# Patient Record
Sex: MALE | Race: WHITE | NOT HISPANIC OR LATINO | Employment: OTHER | URBAN - NONMETROPOLITAN AREA
[De-identification: names, ages, dates, MRNs, and addresses within clinical notes are randomized per-mention and may not be internally consistent; named-entity substitution may affect disease eponyms.]

---

## 2017-04-21 ENCOUNTER — HOSPITAL ENCOUNTER (EMERGENCY)
Facility: HOSPITAL | Age: 62
Discharge: HOME | End: 2017-04-21
Attending: STUDENT IN AN ORGANIZED HEALTH CARE EDUCATION/TRAINING PROGRAM | Admitting: STUDENT IN AN ORGANIZED HEALTH CARE EDUCATION/TRAINING PROGRAM

## 2017-04-21 VITALS
SYSTOLIC BLOOD PRESSURE: 138 MMHG | OXYGEN SATURATION: 93 % | TEMPERATURE: 98.6 F | HEIGHT: 69 IN | HEART RATE: 81 BPM | RESPIRATION RATE: 19 BRPM | DIASTOLIC BLOOD PRESSURE: 71 MMHG | WEIGHT: 280 LBS | BODY MASS INDEX: 41.47 KG/M2

## 2017-04-21 DIAGNOSIS — K62.5 BRBPR (BRIGHT RED BLOOD PER RECTUM): Primary | ICD-10-CM

## 2017-04-21 LAB
ALBUMIN SERPL-MCNC: 4.4 G/DL (ref 3.5–5)
ALBUMIN/GLOB SERPL: 1.3 G/DL (ref 1–2)
ALP SERPL-CCNC: 99 U/L (ref 38–126)
ALT SERPL W P-5'-P-CCNC: 110 U/L (ref 13–69)
ANION GAP SERPL CALCULATED.3IONS-SCNC: 19.1 MMOL/L
APTT PPP: 24.1 SECONDS (ref 25–36)
AST SERPL-CCNC: 96 U/L (ref 15–46)
BASOPHILS # BLD AUTO: 0.06 10*3/MM3 (ref 0–0.2)
BASOPHILS NFR BLD AUTO: 0.9 % (ref 0–2.5)
BILIRUB SERPL-MCNC: 1.1 MG/DL (ref 0.2–1.3)
BUN BLD-MCNC: 18 MG/DL (ref 7–20)
BUN/CREAT SERPL: 22.5 (ref 6.3–21.9)
CALCIUM SPEC-SCNC: 9 MG/DL (ref 8.4–10.2)
CHLORIDE SERPL-SCNC: 102 MMOL/L (ref 98–107)
CO2 SERPL-SCNC: 20 MMOL/L (ref 26–30)
CREAT BLD-MCNC: 0.8 MG/DL (ref 0.6–1.3)
DEPRECATED RDW RBC AUTO: 43.8 FL (ref 37–54)
EOSINOPHIL # BLD AUTO: 0.07 10*3/MM3 (ref 0–0.7)
EOSINOPHIL NFR BLD AUTO: 1.1 % (ref 0–7)
ERYTHROCYTE [DISTWIDTH] IN BLOOD BY AUTOMATED COUNT: 13.5 % (ref 11.5–14.5)
GFR SERPL CREATININE-BSD FRML MDRD: 99 ML/MIN/1.73
GLOBULIN UR ELPH-MCNC: 3.4 GM/DL
GLUCOSE BLD-MCNC: 103 MG/DL (ref 74–98)
HCT VFR BLD AUTO: 44.9 % (ref 42–52)
HGB BLD-MCNC: 15.6 G/DL (ref 14–18)
IMM GRANULOCYTES # BLD: 0.02 10*3/MM3 (ref 0–0.06)
IMM GRANULOCYTES NFR BLD: 0.3 % (ref 0–0.6)
INR PPP: 1.1 (ref 0.9–1.1)
LYMPHOCYTES # BLD AUTO: 2.26 10*3/MM3 (ref 0.6–3.4)
LYMPHOCYTES NFR BLD AUTO: 34.8 % (ref 10–50)
MCH RBC QN AUTO: 30.9 PG (ref 27–31)
MCHC RBC AUTO-ENTMCNC: 34.7 G/DL (ref 30–37)
MCV RBC AUTO: 88.9 FL (ref 80–94)
MONOCYTES # BLD AUTO: 0.61 10*3/MM3 (ref 0–0.9)
MONOCYTES NFR BLD AUTO: 9.4 % (ref 0–12)
NEUTROPHILS # BLD AUTO: 3.48 10*3/MM3 (ref 2–6.9)
NEUTROPHILS NFR BLD AUTO: 53.5 % (ref 37–80)
NRBC BLD MANUAL-RTO: 0 /100 WBC (ref 0–0)
PLATELET # BLD AUTO: 232 10*3/MM3 (ref 130–400)
PMV BLD AUTO: 9.7 FL (ref 6–12)
POTASSIUM BLD-SCNC: 4.1 MMOL/L (ref 3.5–5.1)
PROT SERPL-MCNC: 7.8 G/DL (ref 6.3–8.2)
PROTHROMBIN TIME: 12 SECONDS (ref 9.3–12.1)
RBC # BLD AUTO: 5.05 10*6/MM3 (ref 4.7–6.1)
SODIUM BLD-SCNC: 137 MMOL/L (ref 137–145)
WBC NRBC COR # BLD: 6.5 10*3/MM3 (ref 4.8–10.8)

## 2017-04-21 PROCEDURE — 80053 COMPREHEN METABOLIC PANEL: CPT | Performed by: STUDENT IN AN ORGANIZED HEALTH CARE EDUCATION/TRAINING PROGRAM

## 2017-04-21 PROCEDURE — 85730 THROMBOPLASTIN TIME PARTIAL: CPT | Performed by: STUDENT IN AN ORGANIZED HEALTH CARE EDUCATION/TRAINING PROGRAM

## 2017-04-21 PROCEDURE — 85025 COMPLETE CBC W/AUTO DIFF WBC: CPT | Performed by: STUDENT IN AN ORGANIZED HEALTH CARE EDUCATION/TRAINING PROGRAM

## 2017-04-21 PROCEDURE — 85610 PROTHROMBIN TIME: CPT | Performed by: STUDENT IN AN ORGANIZED HEALTH CARE EDUCATION/TRAINING PROGRAM

## 2017-04-21 PROCEDURE — 99283 EMERGENCY DEPT VISIT LOW MDM: CPT

## 2017-04-21 RX ORDER — ASPIRIN 81 MG/1
81 TABLET, CHEWABLE ORAL DAILY
COMMUNITY
End: 2017-06-15

## 2017-04-21 RX ORDER — ALLOPURINOL 300 MG/1
300 TABLET ORAL DAILY
COMMUNITY

## 2017-04-21 RX ORDER — ATENOLOL 25 MG/1
25 TABLET ORAL DAILY
COMMUNITY

## 2017-04-21 RX ORDER — ATORVASTATIN CALCIUM 40 MG/1
40 TABLET, FILM COATED ORAL DAILY
COMMUNITY

## 2017-04-21 RX ORDER — LISINOPRIL 20 MG/1
20 TABLET ORAL DAILY
COMMUNITY

## 2017-04-21 NOTE — ED NOTES
Called Dr. Underwood's office for Dr. Shields to schedule appt for patient. First available appt is May 25, 2017 at 10:00 am.  Dr. Underwood's office will mail patient a packet prior to appt.     Aryan Craven  04/21/17 0934

## 2017-04-21 NOTE — ED PROVIDER NOTES
Subjective   HPI Comments: 61-year-old male that presents with concerns for bloody stools.  He states over the past 2 days he has had bloody stool in the morning when he gets up.  States he feels a little nauseous today but has not vomited.  States he has had a little bit of blood on the toilet paper when he wiped for the past few months.  He has no true abdominal pain.  He is concerned about colon cancer as he has had 2 uncles that have had colon cancer in their 70s.  His last colonoscopy was 5 years ago and he has an appointment next week with his primary care provider to set up another colonoscopy.      Review of Systems   All other systems reviewed and are negative.      Past Medical History:   Diagnosis Date   • Gout    • Hypertension        No Known Allergies    History reviewed. No pertinent surgical history.    History reviewed. No pertinent family history.    Social History     Social History   • Marital status:      Spouse name: N/A   • Number of children: N/A   • Years of education: N/A     Social History Main Topics   • Smoking status: Former Smoker   • Smokeless tobacco: None   • Alcohol use Yes      Comment: 8-10  beers daily,  and  shots whiskey, -daily   • Drug use: No   • Sexual activity: Not Asked     Other Topics Concern   • None     Social History Narrative   • None           Objective   Physical Exam   Nursing note and vitals reviewed.  GEN: No acute distress  Head: Normocephalic, atraumatic  Eyes: Pupils equal round reactive to light  ENT: Posterior pharynx normal in appearance, oral mucosa is moist  Chest: Nontender to palpation  Cardiovascular: Regular rate  Lungs: Clear to auscultation bilaterally  Abdomen: Soft, nontender, nondistended, no peritoneal signs  Extremities: No edema, normal appearance  Neuro: GCS 15  Psych: Mood and affect are appropriate  Rectal exam: No gross blood on rectal exam.  No hemorrhoid internal or external found.  No palpable mass or  fissure.    Procedures         ED Course  ED Course                  MDM  Number of Diagnoses or Management Options  BRBPR (bright red blood per rectum):   Diagnosis management comments: Patient is a chronic drinker.  He stated to me during our second conversation that he normally drinks 45 beers in the morning a couple shots of whiskey and then after his afternoon nap he will drink for 5 more beers a couple shots of whiskey.  Unsure what is causing his bleeding at this time but he is hemodynamically stable with an H&H that is normal.  We did call 's office in the first available appointment for him was established.  The give the patient signs or symptoms to look for to prompt follow-up       Amount and/or Complexity of Data Reviewed  Clinical lab tests: ordered and reviewed        Final diagnoses:   BRBPR (bright red blood per rectum)            Terrence Shields MD  04/21/17 0961

## 2017-05-25 ENCOUNTER — PREP FOR SURGERY (OUTPATIENT)
Dept: OTHER | Facility: HOSPITAL | Age: 62
End: 2017-05-25

## 2017-05-25 ENCOUNTER — OFFICE VISIT (OUTPATIENT)
Dept: GASTROENTEROLOGY | Facility: CLINIC | Age: 62
End: 2017-05-25

## 2017-05-25 VITALS
WEIGHT: 276 LBS | HEIGHT: 69 IN | BODY MASS INDEX: 40.88 KG/M2 | DIASTOLIC BLOOD PRESSURE: 77 MMHG | HEART RATE: 88 BPM | SYSTOLIC BLOOD PRESSURE: 145 MMHG | RESPIRATION RATE: 18 BRPM | TEMPERATURE: 99.5 F

## 2017-05-25 DIAGNOSIS — Z12.11 COLON CANCER SCREENING: Primary | ICD-10-CM

## 2017-05-25 DIAGNOSIS — K62.5 BRIGHT RED BLOOD PER RECTUM: ICD-10-CM

## 2017-05-25 DIAGNOSIS — R79.89 ELEVATED LIVER FUNCTION TESTS: Chronic | ICD-10-CM

## 2017-05-25 PROCEDURE — 99244 OFF/OP CNSLTJ NEW/EST MOD 40: CPT | Performed by: NURSE PRACTITIONER

## 2017-05-25 RX ORDER — SODIUM CHLORIDE 0.9 % (FLUSH) 0.9 %
1-10 SYRINGE (ML) INJECTION AS NEEDED
Status: CANCELLED | OUTPATIENT
Start: 2017-05-25

## 2017-05-25 RX ORDER — SODIUM CHLORIDE 9 MG/ML
70 INJECTION, SOLUTION INTRAVENOUS CONTINUOUS PRN
Status: CANCELLED | OUTPATIENT
Start: 2017-05-25

## 2017-06-01 ENCOUNTER — HOSPITAL ENCOUNTER (OUTPATIENT)
Dept: ULTRASOUND IMAGING | Facility: HOSPITAL | Age: 62
Discharge: HOME | End: 2017-06-01
Admitting: NURSE PRACTITIONER

## 2017-06-01 DIAGNOSIS — R79.89 ELEVATED LIVER FUNCTION TESTS: Chronic | ICD-10-CM

## 2017-06-01 PROCEDURE — 76700 US EXAM ABDOM COMPLETE: CPT

## 2017-06-20 ENCOUNTER — HOSPITAL ENCOUNTER (OUTPATIENT)
Facility: HOSPITAL | Age: 62
Setting detail: HOSPITAL OUTPATIENT SURGERY
Discharge: HOME | End: 2017-06-20
Attending: INTERNAL MEDICINE | Admitting: INTERNAL MEDICINE

## 2017-06-20 ENCOUNTER — ANESTHESIA EVENT (OUTPATIENT)
Dept: GASTROENTEROLOGY | Facility: HOSPITAL | Age: 62
End: 2017-06-20

## 2017-06-20 ENCOUNTER — ANESTHESIA (OUTPATIENT)
Dept: GASTROENTEROLOGY | Facility: HOSPITAL | Age: 62
End: 2017-06-20

## 2017-06-20 VITALS
SYSTOLIC BLOOD PRESSURE: 128 MMHG | DIASTOLIC BLOOD PRESSURE: 64 MMHG | BODY MASS INDEX: 40.88 KG/M2 | HEIGHT: 69 IN | HEART RATE: 80 BPM | OXYGEN SATURATION: 98 % | RESPIRATION RATE: 16 BRPM | TEMPERATURE: 98.2 F | WEIGHT: 276 LBS

## 2017-06-20 DIAGNOSIS — Z12.11 COLON CANCER SCREENING: ICD-10-CM

## 2017-06-20 DIAGNOSIS — K62.5 BRIGHT RED BLOOD PER RECTUM: ICD-10-CM

## 2017-06-20 PROCEDURE — 25010000002 ONDANSETRON PER 1 MG: Performed by: NURSE ANESTHETIST, CERTIFIED REGISTERED

## 2017-06-20 PROCEDURE — 45384 COLONOSCOPY W/LESION REMOVAL: CPT | Performed by: INTERNAL MEDICINE

## 2017-06-20 PROCEDURE — 25010000002 PROPOFOL 200 MG/20ML EMULSION: Performed by: NURSE ANESTHETIST, CERTIFIED REGISTERED

## 2017-06-20 RX ORDER — ASPIRIN 81 MG/1
81 TABLET, CHEWABLE ORAL DAILY
COMMUNITY
End: 2017-10-02

## 2017-06-20 RX ORDER — SODIUM CHLORIDE 0.9 % (FLUSH) 0.9 %
1-10 SYRINGE (ML) INJECTION AS NEEDED
Status: DISCONTINUED | OUTPATIENT
Start: 2017-06-20 | End: 2017-06-20 | Stop reason: HOSPADM

## 2017-06-20 RX ORDER — PROPOFOL 10 MG/ML
INJECTION, EMULSION INTRAVENOUS AS NEEDED
Status: DISCONTINUED | OUTPATIENT
Start: 2017-06-20 | End: 2017-06-20 | Stop reason: SURG

## 2017-06-20 RX ORDER — SODIUM CHLORIDE 9 MG/ML
70 INJECTION, SOLUTION INTRAVENOUS CONTINUOUS PRN
Status: DISCONTINUED | OUTPATIENT
Start: 2017-06-20 | End: 2017-06-20 | Stop reason: HOSPADM

## 2017-06-20 RX ORDER — ONDANSETRON 2 MG/ML
INJECTION INTRAMUSCULAR; INTRAVENOUS AS NEEDED
Status: DISCONTINUED | OUTPATIENT
Start: 2017-06-20 | End: 2017-06-20 | Stop reason: SURG

## 2017-06-20 RX ADMIN — ONDANSETRON 4 MG: 2 INJECTION INTRAMUSCULAR; INTRAVENOUS at 09:17

## 2017-06-20 RX ADMIN — SODIUM CHLORIDE 70 ML/HR: 9 INJECTION, SOLUTION INTRAVENOUS at 08:06

## 2017-06-20 RX ADMIN — PROPOFOL 100 MG: 10 INJECTION, EMULSION INTRAVENOUS at 09:27

## 2017-06-20 RX ADMIN — PROPOFOL 200 MG: 10 INJECTION, EMULSION INTRAVENOUS at 09:20

## 2017-06-20 RX ADMIN — PROPOFOL 100 MG: 10 INJECTION, EMULSION INTRAVENOUS at 09:36

## 2017-06-20 NOTE — PLAN OF CARE
Problem: GI Endoscopy (Adult)  Goal: Signs and Symptoms of Listed Potential Problems Will be Absent or Manageable (GI Endoscopy)  Outcome: Ongoing (interventions implemented as appropriate)    06/20/17 0741   GI Endoscopy   Problems Assessed (GI Endoscopy) all   Problems Present (GI Endoscopy) none

## 2017-06-20 NOTE — OP NOTE
PROCEDURE:  Colonoscopy to the terminal ileum with 8 hot biopsy polypectomies.     DATE OF PROCEDURE:  June 20, 2017    REFERRING PROVIDER:  OCTAVIA Boo     INSTRUMENT USED:  Olympus PCF H 190 DL videocolonoscope.     INDICATIONS OF THE PROCEDURE:   This is a 61-year-old white male for colon cancer screening.  There is history of bright red blood per rectum.  The patient previously had undergone a colonoscopy 5 years ago with polyps.     BIOPSIES: Hepatic flexure: One hot biopsy polypectomy.  Descending colon: 1 hot biopsy polypectomy.  Sigmoid colon: 1 hot biopsy polypectomy.  Rectum: 5 hot biopsy polypectomies.       PHOTOGRAPHS:  Photographs were included in the medical records.     MEDICATIONS:  MAC.       CONSENT/PREPROCEDURE EVALUATION:  Risks, benefits, alternatives and options of the procedure including risks of sedation/anesthesia were discussed with the patient and informed consent was obtained prior to the procedure.  History and physical examination were performed and nothing precluded the test.      REPORT:  The patient was placed in left lateral decubitus position and a digital examination was performed.  Once under the influence of IV sedation, the instrument was inserted into the rectum and advanced under direct vision to cecum which was identified by the ileocecal valve, triradiate folds and appendiceal orifice. The scope was then maneuvered into the terminal ileum.        FINDINGS:      Digital rectal examination:  Good anal tone.  No perianal pathology.  No mass.        Terminal ileum:  7-8 cm.  Normal.     Cecum and ascending colon: Normal.       Hepatic flexure, transverse colon, splenic flexure:  A 4 mm sessile polyp was noted at the hepatic flexure.  This was removed with hot biopsy forceps.         Descending colon, sigmoid colon and rectum:  Scant diverticulosis was seen involving the left colon.  7, 3-4 millimeters sessile polyps were noted one in the descending colon, 1 in the  sigmoid colon and 5 in the rectum which were removed with hot biopsy forceps.   A retroflex examination within the rectum revealed internal hemorrhoids.     Annoscopy: Using a clear annoscope and colonoscope as a light source, annoscopy was performed.  Dentate line was noted.  No fissures or tears were seen.  The scope was then pulled out of the patient.  The patient tolerated the procedure well.       The scope was then straightened, the lower GI tract was decompressed, and the scope was pulled out of the patient.  The patient tolerated the procedure well.  There were no immediate complications and the patient was transferred in stable condition for post procedure observation.      TECHNICAL DATA:   1. Orleans prep score: 8 (3+2+3).     2. Anus to cecum time: 2 minutes.  3. Difficulty of examination:  Average.     4. Withdrawal time: 10 min.   5. Total Procedure Time: 23 minutes.  6. Retroflex examination in right colon: Yes.    7. Second look Rectum to cecum with decompression: Yes.    DIAGNOSES:    1. Scant diverticular change in the left colon.  2. Colon polyps.  3. Internal hemorrhoids.    RECOMMENDATIONS:     1. Follow biopsies.    2. Follow-up: 3-4 weeks.    3. Followup colonoscopy in 5 years.     4. Dietary instructions.  5. Hemorrhoidal care.     Thank you very much for letting me participate in the care of this patient. Please do not hesitate to call me if you have any questions.

## 2017-06-20 NOTE — H&P (VIEW-ONLY)
[address]  [facility] [id number]    Chief Complaint   Patient presents with   • Hematochezia   • Colon Cancer Screening     The patient denies recent change in bowel habits. There is no diarrhea or constipation. There is no history of abdominal pain. There is a history of intermittent bright red blood per rectum. The patient states he has had 3 episodes, the most recent being 2 weeks ago. The patient denies nausea or vomiting. There is no history of reflux. The patient denies dysphagia or odynophagia. There is no history of recent significant weight loss. There is no history of liver disease in the past. There is no family history of colon cancer. The patient's last colonoscopy was 5 years ago with polyps.    The patient has a history of elevated liver enzymes. He states he does drink 3-4 beers in the am, 3-4 beers in the afternoon and occasionally more in the evening. He states he has had elevated liver enzymes 3-4 years as far as he is aware of. He states he has drank alcohol since he was 15 years old. There is a history of tattoo many years ago. No IVDA. No history of blood transfusions.    Rectal Bleeding   This is a recurrent problem. Episode onset: Last episode 2 weeks ago. The problem occurs intermittently. The problem has been rapidly improving. Associated symptoms include arthralgias. Pertinent negatives include no abdominal pain, chest pain, chills, coughing, fatigue, fever, headaches, joint swelling, myalgias, nausea, rash, vertigo or vomiting. Exacerbated by: bowel movement. He has tried nothing for the symptoms.     Review of Systems   Constitutional: Negative for appetite change, chills, fatigue, fever and unexpected weight change.   HENT: Negative for mouth sores, nosebleeds and trouble swallowing.    Eyes: Negative for discharge and redness.   Respiratory: Negative for apnea, cough and shortness of breath.    Cardiovascular: Negative for chest pain, palpitations and leg swelling.   Gastrointestinal:  Positive for hematochezia. Negative for abdominal distention, abdominal pain, anal bleeding, blood in stool, constipation, diarrhea, nausea and vomiting.   Endocrine: Negative for cold intolerance, heat intolerance and polydipsia.   Genitourinary: Negative for dysuria, hematuria and urgency.   Musculoskeletal: Positive for arthralgias. Negative for joint swelling and myalgias.   Skin: Negative for rash.   Allergic/Immunologic: Negative for food allergies and immunocompromised state.   Neurological: Negative for dizziness, vertigo, seizures, syncope and headaches.   Hematological: Negative for adenopathy. Bruises/bleeds easily.   Psychiatric/Behavioral: Negative for dysphoric mood. The patient is not nervous/anxious and is not hyperactive.      Patient Active Problem List   Diagnosis   • Colon cancer screening   • Bright red blood per rectum   • Elevated liver function tests     Past Medical History:   Diagnosis Date   • Colon polyp 2012   • Elevated LFTs    • Gout    • Heart murmur    • Hypertension    • Sleep apnea    • Snores      Past Surgical History:   Procedure Laterality Date   • COLONOSCOPY  2012   • FOOT SURGERY       Family History   Problem Relation Age of Onset   • Colon cancer Paternal Uncle      Social History   Substance Use Topics   • Smoking status: Former Smoker   • Smokeless tobacco: Never Used      Comment: quit 15 years ago   • Alcohol use Yes      Comment: 8-10  beers daily,  and  shots whiskey, -daily       Current Outpatient Prescriptions:   •  allopurinol (ZYLOPRIM) 300 MG tablet, Take 300 mg by mouth Daily., Disp: , Rfl:   •  aspirin 81 MG chewable tablet, Chew 81 mg Daily., Disp: , Rfl:   •  atenolol (TENORMIN) 25 MG tablet, Take 25 mg by mouth Daily., Disp: , Rfl:   •  atorvastatin (LIPITOR) 40 MG tablet, Take 40 mg by mouth Daily., Disp: , Rfl:   •  lisinopril (PRINIVIL,ZESTRIL) 20 MG tablet, Take 20 mg by mouth Daily., Disp: , Rfl:     No Known Allergies    /77  Pulse 88  Temp  "99.5 °F (37.5 °C)  Resp 18  Ht 69\" (175.3 cm)  Wt 276 lb (125 kg)  BMI 40.76 kg/m2    Physical Exam   Constitutional: He is oriented to person, place, and time. He appears well-developed and well-nourished. No distress.   HENT:   Head: Normocephalic and atraumatic.   Right Ear: Hearing and external ear normal.   Left Ear: Hearing and external ear normal.   Nose: Nose normal.   Mouth/Throat: Oropharynx is clear and moist and mucous membranes are normal. Mucous membranes are not pale, not dry and not cyanotic. No oral lesions. No oropharyngeal exudate.   Eyes: Conjunctivae and EOM are normal. Right eye exhibits no discharge. Left eye exhibits no discharge.   Neck: Trachea normal. Neck supple. No JVD present. No edema present. No thyroid mass and no thyromegaly present.   Cardiovascular: Normal rate, regular rhythm, S2 normal and normal heart sounds.  Exam reveals no gallop, no S3 and no friction rub.    No murmur heard.  Pulmonary/Chest: Effort normal and breath sounds normal. No respiratory distress. He exhibits no tenderness.   Abdominal: Normal appearance and bowel sounds are normal. He exhibits no distension, no ascites and no mass. There is no splenomegaly or hepatomegaly. There is no tenderness. There is no rigidity, no rebound and no guarding. No hernia.       Vascular Status -  His exam exhibits no right foot edema. His exam exhibits no left foot edema.  Lymphadenopathy:     He has no cervical adenopathy.        Left: No supraclavicular adenopathy present.   Neurological: He is alert and oriented to person, place, and time. He has normal strength. No cranial nerve deficit or sensory deficit.   Skin: No rash noted. He is not diaphoretic. No cyanosis. No pallor. Nails show no clubbing.   Psychiatric: He has a normal mood and affect.   Nursing note and vitals reviewed.  Stigmata of chronic liver disease:  None.  Asterixis:  None.    Laboratory Results:   Upon Review of records:    Dated 4/21/2017 glucose 103 " BUN 18 creatinine 0.8 sodium 137 potassium 4.1 chloride 102 CO2 20 calcium 9.0 albumin 4.4  AST 96 alkaline phosphatase 99 total bilirubin 1.1 WBC 6.5 hemoglobin 15.6 hematocrit 44.9 platelet count 232 MCV 88.9 PTT 24.1 PT-INR 12.0/1.1    Abdominal Imaging:  Upon review of records:    CT of abdomen and pelvis without contrast dated 12/3/2015 reveals no renal or ureteral stone or hydronephrosis.  The bladder is normal in contour.  The liver is low and attenuation consistent with fatty infiltration.  The gallbladder, spleen, pancreas and adrenal glands appear normal.  Bowel gas pattern is nonobstructive.  No focal inflammatory changes are present.  There is no evidence for appendicitis.  There is no bowel wall thickening or mesenteric inflammation.  The aorta and iliac arteries are calcified.    Damion was seen today for hematochezia and colon cancer screening.    Diagnoses and all orders for this visit:    Colon cancer screening  Comments:  Last colonoscopy was 5 years ago with polyps. No family history of colon cancer.    Bright red blood per rectum  Comments:  Differentials include hemorrhoids, fissure, vascular ectasia.    Elevated liver function tests  Comments:  Differentials include alcohol use, CHANCE, although this is a diagnosis of exclusion.  Orders:  -     Comprehensive Metabolic Panel  -     Hepatitis A Antibody, IgM  -     Hepatitis A Antibody, Total  -     Hepatitis B Core Antibody, IgM  -     Hepatitis B Surface Antibody  -     Hepatitis B Surface Antigen  -     Hepatitis B Core Antibody, Total  -     Hepatitis C Antibody  -     US Abdomen Complete; Future        Plan  and Patient Instructions:  Patient Instructions   1. Check CMP, hepatitis panel.  2. Abdominal ultrasound.  3. Recommended to avoid alcohol.  4. Low fat diet with exercise and weight loss.  5. Colonoscopy: Description of the procedure, risks, benefits, alternatives and options, including nonoperative options, were discussed with the  patient in detail. The patient understands and wishes to proceed.  6. Possible non-invasive liver work up in the future.    Patient Care Team:  OCTAVIA Boo as PCP - General (Nurse Practitioner)    OCTAVIA Kaur

## 2017-06-20 NOTE — ANESTHESIA PREPROCEDURE EVALUATION
Anesthesia Evaluation     Patient summary reviewed and Nursing notes reviewed   no history of anesthetic complications:  NPO Solid Status: > 8 hours  NPO Liquid Status: > 8 hours     Airway   Mallampati: II  TM distance: >3 FB  Neck ROM: limited  possible difficult intubation  Dental - normal exam     Pulmonary - normal exam   (+) sleep apnea,   (-) not a smoker  Cardiovascular - normal exam    Patient on routine beta blocker and Beta blocker not taken-may be given intraoperatively    (+) hypertension, valvular problems/murmurs murmur,     ROS comment: FINDINGS: There is centrilobular emphysema. There are a few scattered   bilateral calcified granulomas, largest of which is in the left upper   lobe. No suspicious noncalcified pulmonary nodules are identified. There   is no airspace disease. The heart is normal in size. The aorta is normal   in caliber. There are no pleural or pericardial effusions. The   visualized upper abdomen is unremarkable. Bone windows reveal no acute   osseous abnormalities.     Neuro/Psych- negative ROS  GI/Hepatic/Renal/Endo    (+) morbid obesity,     Musculoskeletal (-) negative ROS    Abdominal    Substance History - negative use     OB/GYN negative ob/gyn ROS         Other - negative ROS                                     Anesthesia Plan    ASA 3     MAC   (Risks and benefits discussed including risk of aspiration, recall and dental damage. All patient questions answered. Will continue with POC.)  intravenous induction   Anesthetic plan and risks discussed with patient.

## 2017-06-20 NOTE — DISCHARGE INSTRUCTIONS
To assist you in voiding:  Drink plenty of fluids  Listen to running water while attempting to void.    If you are unable to urinate and you have an uncomfortable urge to void or it has been   6 hours since you were discharged, return to the Emergency Room    Diet:     Low Fat Diet.        Blood Thinner Directions:    ·  Avoid Aspirin & other NSAIDS for _7__ days.  Tylenol is okay.    Treatments:    · If constipation persists may use:  Herbert magnesium caplet (Over-the-counter).  Take one orally at night.    Other Instructions:    CORTIZONE 10 OINTMENT (hydrocortisone 1% ointment) over the counter.  AVOID CREAM OR GEL.  Apply anorectally  2-3 times a day for 1 week.  May need to use a liner to protect the garments.    Call [facility] at [phone] or come to the Emergency Department if you experience the following: Chest pain, abdominal pain, bleeding (vomiting of blood or coffee colored material, black stools or xenia blood in stools), fever/chills, nausea and vomiting or dizziness.      Follow-up:  DR. MIGUE MOCTEZUMA in 4 weeks.Office phone # [phone].  SEE NEXT PAGE

## 2017-06-20 NOTE — ANESTHESIA POSTPROCEDURE EVALUATION
Patient: Damion Todd Jr.    Procedure Summary     Date Anesthesia Start Anesthesia Stop Room / Location    06/20/17 0917 0952 [facility] ENDOSCOPY 2 / [facility] ENDOSCOPY       Procedure Diagnosis Surgeon Provider    COLONOSCOPY WITH HOT BIOPSY POLYPECTOMIES (N/A Anus) Bright red blood per rectum; Colon cancer screening  (Bright red blood per rectum [K62.5]; Colon cancer screening [Z12.11]) MD Selwyn Bland CRNA          Anesthesia Type: MAC  Last vitals  BP      Temp      Pulse     Resp      SpO2        Post Anesthesia Care and Evaluation    Patient location during evaluation: bedside  Patient participation: complete - patient participated  Level of consciousness: awake  Pain score: 0  Pain management: adequate  Airway patency: patent  Anesthetic complications: No anesthetic complications  PONV Status: controlled  Cardiovascular status: acceptable and stable  Respiratory status: acceptable and room air  Hydration status: acceptable

## 2017-06-23 LAB
LAB AP CASE REPORT: NORMAL
Lab: NORMAL
PATH REPORT.FINAL DX SPEC: NORMAL

## 2017-07-19 ENCOUNTER — TELEPHONE (OUTPATIENT)
Dept: GASTROENTEROLOGY | Facility: CLINIC | Age: 62
End: 2017-07-19

## 2017-09-05 ENCOUNTER — TRANSCRIBE ORDERS (OUTPATIENT)
Dept: ENDOCRINOLOGY | Facility: CLINIC | Age: 62
End: 2017-09-05

## 2017-09-05 DIAGNOSIS — R94.6 THYROID FUNCTION TEST ABNORMAL: Primary | ICD-10-CM

## 2017-09-28 ENCOUNTER — TRANSCRIBE ORDERS (OUTPATIENT)
Dept: ADMINISTRATIVE | Facility: HOSPITAL | Age: 62
End: 2017-09-28

## 2017-09-28 ENCOUNTER — APPOINTMENT (OUTPATIENT)
Dept: LAB | Facility: HOSPITAL | Age: 62
End: 2017-09-28

## 2017-09-28 DIAGNOSIS — R79.89 ELEVATED LIVER FUNCTION TESTS: Primary | ICD-10-CM

## 2017-09-28 LAB
ALBUMIN SERPL-MCNC: 4.2 G/DL (ref 3.5–5)
ALBUMIN/GLOB SERPL: 1.5 G/DL (ref 1–2)
ALP SERPL-CCNC: 108 U/L (ref 38–126)
ALT SERPL W P-5'-P-CCNC: 56 U/L (ref 13–69)
ANION GAP SERPL CALCULATED.3IONS-SCNC: 16.9 MMOL/L
AST SERPL-CCNC: 33 U/L (ref 15–46)
BILIRUB SERPL-MCNC: 0.3 MG/DL (ref 0.2–1.3)
BUN BLD-MCNC: 17 MG/DL (ref 7–20)
BUN/CREAT SERPL: 18.9 (ref 6.3–21.9)
CALCIUM SPEC-SCNC: 9.3 MG/DL (ref 8.4–10.2)
CHLORIDE SERPL-SCNC: 106 MMOL/L (ref 98–107)
CO2 SERPL-SCNC: 22 MMOL/L (ref 26–30)
CREAT BLD-MCNC: 0.9 MG/DL (ref 0.6–1.3)
GFR SERPL CREATININE-BSD FRML MDRD: 86 ML/MIN/1.73
GLOBULIN UR ELPH-MCNC: 2.8 GM/DL
GLUCOSE BLD-MCNC: 113 MG/DL (ref 74–98)
POTASSIUM BLD-SCNC: 3.9 MMOL/L (ref 3.5–5.1)
PROT SERPL-MCNC: 7 G/DL (ref 6.3–8.2)
SODIUM BLD-SCNC: 141 MMOL/L (ref 137–145)

## 2017-09-28 PROCEDURE — 86706 HEP B SURFACE ANTIBODY: CPT | Performed by: NURSE PRACTITIONER

## 2017-09-28 PROCEDURE — 86704 HEP B CORE ANTIBODY TOTAL: CPT | Performed by: NURSE PRACTITIONER

## 2017-09-28 PROCEDURE — 80074 ACUTE HEPATITIS PANEL: CPT | Performed by: NURSE PRACTITIONER

## 2017-09-28 PROCEDURE — 80053 COMPREHEN METABOLIC PANEL: CPT | Performed by: NURSE PRACTITIONER

## 2017-09-28 PROCEDURE — 36415 COLL VENOUS BLD VENIPUNCTURE: CPT | Performed by: NURSE PRACTITIONER

## 2017-09-28 PROCEDURE — 86708 HEPATITIS A ANTIBODY: CPT | Performed by: NURSE PRACTITIONER

## 2017-09-29 LAB
HAV AB SER QL IA: POSITIVE
HAV IGM SERPL QL IA: NEGATIVE
HBV CORE AB SER DONR QL IA: NEGATIVE
HBV CORE IGM SERPL QL IA: NEGATIVE
HBV SURFACE AB SER QL: NON REACTIVE
HBV SURFACE AG SERPL QL IA: NEGATIVE
HCV AB S/CO SERPL IA: <0.1 S/CO RATIO (ref 0–0.9)

## 2017-10-02 ENCOUNTER — OFFICE VISIT (OUTPATIENT)
Dept: GASTROENTEROLOGY | Facility: CLINIC | Age: 62
End: 2017-10-02

## 2017-10-02 VITALS
HEIGHT: 69 IN | HEART RATE: 87 BPM | SYSTOLIC BLOOD PRESSURE: 120 MMHG | BODY MASS INDEX: 41.62 KG/M2 | DIASTOLIC BLOOD PRESSURE: 78 MMHG | RESPIRATION RATE: 18 BRPM | WEIGHT: 281 LBS | TEMPERATURE: 98.6 F

## 2017-10-02 DIAGNOSIS — D12.6 ADENOMATOUS POLYP OF COLON, UNSPECIFIED PART OF COLON: Primary | ICD-10-CM

## 2017-10-02 DIAGNOSIS — E66.3 OVER WEIGHT: ICD-10-CM

## 2017-10-02 DIAGNOSIS — R79.89 ABNORMAL LFTS: ICD-10-CM

## 2017-10-02 PROCEDURE — 99214 OFFICE O/P EST MOD 30 MIN: CPT | Performed by: INTERNAL MEDICINE

## 2017-10-02 RX ORDER — ASPIRIN 81 MG
81 TABLET, DELAYED RELEASE (ENTERIC COATED) ORAL DAILY
COMMUNITY
Start: 2017-09-06

## 2017-10-02 NOTE — PATIENT INSTRUCTIONS
1. Low fat-high-fiber diet with liberal water intake.   2. Weight reduction.  3. Follow-up liver function tests in 6 months.  4. Continue to avoid alcohol.  5. Follow-up colonoscopy in 5 years.  June 2022.  6. Follow-up in 6 months.

## 2017-10-02 NOTE — PROGRESS NOTES
Chief Complaint   Patient presents with   • Follow-up       History of Present Illness     The patient came back for follow visit today.  The patient feels ok.  The patient has been having on average one bowel movement a day.  However, stools are rather hard.  There is no history of diarrhea.  He denies further bright red blood per rectum.  There is no history of abdominal pain.  There is no history of overt GI bleed (hematemesis melena or hematochezia).  The patient denies nausea or vomiting.  There is no history of reflux.  The patient denies dysphagia or odynophagia.  There is no history of recent significant weight loss.      The patient has history of abnormal liver function tests described as elevation of ALT and AST perhaps for the last 3-4 years.  There is history of heavy alcohol use over the years.  The patient quit alcohol altogether about 1 year ago.  There is no history of pancreatitis.     Review of Systems   Constitutional: Negative for appetite change, chills, fatigue, fever and unexpected weight change.   HENT: Negative for mouth sores, nosebleeds and trouble swallowing.    Eyes: Negative for discharge and redness.   Respiratory: Negative for apnea, cough and shortness of breath.    Cardiovascular: Negative for chest pain, palpitations and leg swelling.   Gastrointestinal: Negative for abdominal distention, abdominal pain, anal bleeding, blood in stool, constipation, diarrhea, nausea and vomiting.   Endocrine: Negative for cold intolerance, heat intolerance and polydipsia.   Genitourinary: Negative for dysuria, hematuria and urgency.   Musculoskeletal: Positive for arthralgias. Negative for joint swelling and myalgias.   Skin: Negative for rash.   Allergic/Immunologic: Negative for food allergies and immunocompromised state.   Neurological: Negative for dizziness, seizures, syncope and headaches.   Hematological: Negative for adenopathy. Bruises/bleeds easily.   Psychiatric/Behavioral: Negative for  "dysphoric mood. The patient is not nervous/anxious and is not hyperactive.      Patient Active Problem List   Diagnosis   • Colon cancer screening   • Bright red blood per rectum   • Elevated liver function tests     Past Medical History:   Diagnosis Date   • Colon polyp 2012   • Elevated LFTs    • Gout    • Heart murmur    • Hypertension    • Sleep apnea     REPORTS HE TURNED CPAP IN, WAS UNABLE TO WEAR IT   • Snores      Past Surgical History:   Procedure Laterality Date   • COLONOSCOPY  2012   • COLONOSCOPY N/A 6/20/2017    Procedure: COLONOSCOPY WITH HOT BIOPSY POLYPECTOMIES;  Surgeon: Rohith Underwood MD;  Location: [facility] ENDOSCOPY;  Service:    • FOOT SURGERY Left      Family History   Problem Relation Age of Onset   • Colon cancer Paternal Uncle      Social History   Substance Use Topics   • Smoking status: Former Smoker     Packs/day: 3.00     Years: 30.00     Types: Cigarettes     Quit date: 2011   • Smokeless tobacco: Never Used   • Alcohol use Yes      Comment: REPORTS NO USE IN THE PAST 3 WEEKS.  REPORTS HAS BEEN A HEAVY DRINKER IN THE PAST.        Current Outpatient Prescriptions:   •  allopurinol (ZYLOPRIM) 300 MG tablet, Take 300 mg by mouth Daily., Disp: , Rfl:   •  ASPIRIN LOW DOSE 81 MG EC tablet, , Disp: , Rfl:   •  atenolol (TENORMIN) 25 MG tablet, Take 25 mg by mouth Daily., Disp: , Rfl:   •  atorvastatin (LIPITOR) 40 MG tablet, Take 40 mg by mouth Daily., Disp: , Rfl:   •  lisinopril (PRINIVIL,ZESTRIL) 20 MG tablet, Take 20 mg by mouth Daily., Disp: , Rfl:     No Known Allergies    Blood pressure 120/78, pulse 87, temperature 98.6 °F (37 °C), resp. rate 18, height 69\" (175.3 cm), weight 281 lb (127 kg).    Physical Exam   Constitutional: He is oriented to person, place, and time. He appears well-developed and well-nourished. No distress.   HENT:   Head: Normocephalic and atraumatic.   Right Ear: Hearing and external ear normal.   Left Ear: Hearing and external ear normal.   Nose: Nose normal. "   Mouth/Throat: Oropharynx is clear and moist and mucous membranes are normal. Mucous membranes are not pale, not dry and not cyanotic. No oral lesions. No oropharyngeal exudate.   Eyes: Conjunctivae and EOM are normal. Right eye exhibits no discharge. Left eye exhibits no discharge. No scleral icterus.   Neck: Trachea normal. Neck supple. No JVD present. No edema present. No thyroid mass and no thyromegaly present.   Cardiovascular: Normal rate, regular rhythm, S2 normal and normal heart sounds.  Exam reveals no gallop, no S3 and no friction rub.    No murmur heard.  Pulmonary/Chest: Effort normal and breath sounds normal. No respiratory distress. He has no wheezes. He has no rales. He exhibits no tenderness.   Abdominal: Soft. Normal appearance and bowel sounds are normal. He exhibits no distension, no ascites and no mass. There is no splenomegaly or hepatomegaly. There is no tenderness. There is no rigidity, no rebound and no guarding. A hernia is present. Hernia confirmed positive in the ventral area.   Musculoskeletal: He exhibits no tenderness or deformity.       Vascular Status -  His exam exhibits no right foot edema. His exam exhibits no left foot edema.  Lymphadenopathy:     He has no cervical adenopathy.        Left: No supraclavicular adenopathy present.   Neurological: He is alert and oriented to person, place, and time. He has normal strength. No cranial nerve deficit or sensory deficit. He exhibits normal muscle tone. Coordination normal.   Skin: No rash noted. He is not diaphoretic. No cyanosis. No pallor. Nails show no clubbing.   Psychiatric: He has a normal mood and affect. His behavior is normal. Judgment and thought content normal.   Nursing note and vitals reviewed.    Stigmata of chronic liver disease:  None.  Asterixis:  None.    Laboratory Testing:  Upon review of medical records:    Dated 4/21/2017 glucose 103 BUN 18 creatinine 0.8 sodium 137 potassium 4.1 chloride 102 CO2 20 calcium 9.0  albumin 4.4  AST 96 alkaline phosphatase 99 total bilirubin 1.1 WBC 6.5 hemoglobin 15.6 hematocrit 44.9 platelet count 232 MCV 88.9 PTT 24.1 PT-INR 12.0/1.1    Dated September 28, 2017 sodium 141 potassium 3.9 chloride 106 CO2 22 BUN 17 serum creatinine 0.90 glucose 113.  Calcium 9.3.  Albumin 4.20.  T bili 0.3 AST 33 ALT 56 alkaline phosphatase 108.  Hepatitis A IgM negative.  Hepatitis A total antibody positive.  Hepatitis B surface antibody nonreactive.  Hepatitis B surface antigen negative.  Hepatitis B core IgM negative.  Hepatitis B core total antibody negative.  Hepatitis C virus antibody negative at < 0.1.     Abdominal Imaging:  Upon review of records:     CT of abdomen and pelvis without contrast dated 12/3/2015 reveals no renal or ureteral stone or hydronephrosis.  The bladder is normal in contour.  The liver is low and attenuation consistent with fatty infiltration.  The gallbladder, spleen, pancreas and adrenal glands appear normal.  Bowel gas pattern is nonobstructive.  No focal inflammatory changes are present.  There is no evidence for appendicitis.  There is no bowel wall thickening or mesenteric inflammation.  The aorta and iliac arteries are calcified.     Dated June 1, 2017 the patient underwent an abdominal ultrasound which revealed: Visualized pancreas is unremarkable.  Liver is echogenic consistent with fatty infiltration.  No focal liver lesions are identified.  Gallbladder is unremarkable with no shadowing stones or wall thickening.  Common duct measures 3 mm.  The right kidney measures 11.5 cm in length and is normal in echogenicity without hydronephrosis.  Left kidney measures 11.8 cm in length and is normal in echogenicity without hydronephrosis.  Spleen is unremarkable.  Aorta is normal in caliber.  Vena cava is unremarkable.    Procedure:  Upon review of medical records:     Dated June 20, 2017 the patient underwent a colonoscopy to the terminal ileum which revealed: Scant  diverticular change and left colon.  Colon polyps.  Internal hemorrhoids.  Descending colon, polyp, biopsy revealed hyperplastic polyp.  Sigmoid colon, polyp, biopsy revealed tubular adenoma.  Colon, hepatic flexure, polyp, biopsy revealed tubular adenoma.  Rectal polyps, biopsies revealed hyperplastic polyps.  Benign mucosal prolapse-type polyp.    Assessment and Plan:      Damion was seen today for follow-up.    Diagnoses and all orders for this visit:    Adenomatous polyp of colon, unspecified part of colon    Abnormal LFTs  Comments:  History of abnormal liver function tests.  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 Recent labs normal. The patient has quit drinking about a year ago   Orders:  -     Comprehensive Metabolic Panel; Future    Over weight  Comments:  BMI 41.5              Plan  and Patient Instructions:    Patient Instructions     1. Low fat-high-fiber diet with liberal water intake.   2. Weight reduction.  3. Follow-up liver function tests in 6 months.  4. Continue to avoid alcohol.  5. Follow-up colonoscopy in 5 years.  June 2022.  6. Follow-up in 6 months.        Rohith Underwood MD

## 2018-02-01 ENCOUNTER — HOSPITAL ENCOUNTER (EMERGENCY)
Facility: HOSPITAL | Age: 63
Discharge: HOME | End: 2018-02-01
Attending: STUDENT IN AN ORGANIZED HEALTH CARE EDUCATION/TRAINING PROGRAM | Admitting: STUDENT IN AN ORGANIZED HEALTH CARE EDUCATION/TRAINING PROGRAM

## 2018-02-01 VITALS
WEIGHT: 290 LBS | HEIGHT: 72 IN | RESPIRATION RATE: 18 BRPM | OXYGEN SATURATION: 95 % | BODY MASS INDEX: 39.28 KG/M2 | SYSTOLIC BLOOD PRESSURE: 140 MMHG | DIASTOLIC BLOOD PRESSURE: 79 MMHG | HEART RATE: 93 BPM | TEMPERATURE: 98 F

## 2018-02-01 DIAGNOSIS — M75.22 BICEPS TENDONITIS ON LEFT: Primary | ICD-10-CM

## 2018-02-01 PROCEDURE — 99283 EMERGENCY DEPT VISIT LOW MDM: CPT

## 2018-02-01 RX ORDER — MELOXICAM 15 MG/1
15 TABLET ORAL DAILY
Qty: 20 TABLET | Refills: 0 | Status: SHIPPED | OUTPATIENT
Start: 2018-02-01 | End: 2022-05-19

## 2018-02-01 RX ORDER — DEXAMETHASONE 4 MG/1
10 TABLET ORAL ONCE
Qty: 2.5 TABLET | Refills: 0 | Status: SHIPPED | OUTPATIENT
Start: 2018-02-01 | End: 2018-02-01

## 2018-02-01 NOTE — ED PROVIDER NOTES
Subjective   HPI Comments: 62-year-old male presents with 2 day history of progressively worsening pain in the left elbow.  Pain is located on the anterior aspect at the joint line.  States it hurts when he fully extends his left arm.  Does have a history of gout but normally has that in the lower extremities quickly the feet.  He doesn't recall any injury.  Pain is worse with movement including flexion or extension of the elbow.      Review of Systems   All other systems reviewed and are negative.      Past Medical History:   Diagnosis Date   • Colon polyp 2012   • Elevated LFTs    • Gout    • Heart murmur    • Hypertension    • Sleep apnea     REPORTS HE TURNED CPAP IN, WAS UNABLE TO WEAR IT   • Snores        No Known Allergies    Past Surgical History:   Procedure Laterality Date   • COLONOSCOPY  2012   • COLONOSCOPY N/A 6/20/2017    Procedure: COLONOSCOPY WITH HOT BIOPSY POLYPECTOMIES;  Surgeon: Rohith Underwood MD;  Location: [facility] ENDOSCOPY;  Service:    • FOOT SURGERY Left        Family History   Problem Relation Age of Onset   • Colon cancer Paternal Uncle        Social History     Social History   • Marital status:      Spouse name: N/A   • Number of children: N/A   • Years of education: N/A     Social History Main Topics   • Smoking status: Former Smoker     Packs/day: 3.00     Years: 30.00     Types: Cigarettes     Quit date: 2011   • Smokeless tobacco: Never Used   • Alcohol use 3.6 oz/week     6 Cans of beer per week      Comment: 6 beers per wk   • Drug use: No   • Sexual activity: Defer     Other Topics Concern   • None     Social History Narrative   • None           Objective   Physical Exam   Nursing note and vitals reviewed.  GEN: No acute distress  Head: Normocephalic, atraumatic  Eyes: Pupils equal round reactive to light  ENT: Posterior pharynx normal in appearance, oral mucosa is moist  Chest: Nontender to palpation  Cardiovascular: Regular rate  Lungs: Clear to auscultation  bilaterally  Abdomen: Soft, nontender, nondistended, no peritoneal signs  Extremities: Left elbow is tender to palpation along the distal biceps tendon.  Pain is elicited with passive extension at the left elbow.  Patient does have pain as well when asked to forcefully flex his bicep.  There is no bruising, ecchymosis or obvious deformity.  Neuro: GCS 15  Psych: Mood and affect are appropriate    Procedures         ED Course  ED Course                  MDM  Number of Diagnoses or Management Options  Biceps tendonitis on left:   Diagnosis management comments: Doubt that this is a gout flare.  Do believe his bicep tendinitis.  We will treat with oral steroids as well as anti-inflammatories.      Final diagnoses:   Biceps tendonitis on left            Terrence Shields MD  02/01/18 0777

## 2018-02-01 NOTE — DISCHARGE INSTRUCTIONS
"Biceps Tendon Tendinitis (Distal) Rehab  Ask your health care provider which exercises are safe for you. Do exercises exactly as told by your health care provider and adjust them as directed. It is normal to feel mild stretching, pulling, tightness, or discomfort as you do these exercises, but you should stop right away if you feel sudden pain or your pain gets worse. Do not begin these exercises until told by your health care provider.  Stretching and range of motion exercises  These exercises warm up your muscles and joints and improve the movement and flexibility of your arm. These exercises can also help to relieve pain and stiffness.  Exercise A: Supination, active-assisted  1. Stand or sit with your left / right elbow bent to an \"L\" shape (90 degrees).  2. Rotate your palm up until you cannot rotate it anymore. Then, use your other hand to help rotate your left / right forearm more.  3. Hold this position for __________ seconds.  4. Slowly return to the starting position.  Repeat __________ times. Complete this exercise __________ times a day.  Exercise B: Pronation, active-assisted  1. Stand or sit with your left / right elbow bent to an \"L\" shape (90 degrees).  2. Rotate your left / right palm down until you cannot rotate it anymore. Then, use your other hand to help rotate your left / right forearm more.  3. Hold this position for __________ seconds.  4. Slowly return to the starting position.  Repeat __________ times. Complete this exercise __________ times a day.  Strengthening exercises  These exercises build strength and endurance in your arm and shoulder. Endurance is the ability to use your muscles for a long time, even after your muscles get tired.  Exercise C: Supination  1. Sit with your left / right forearm supported on a table. Your elbow should be at waist height.  2. Rest your hand over the edge of the table, palm-down.  3. Gently grasp a lightweight hammer near the head. As this exercise gets " "easier for you, try holding the hammer farther down the handle.  4. Without moving your elbow, slowly rotate your palm up so it faces the ceiling.  5. Hold this position for__________ seconds.  6. Slowly return to the starting position.  Repeat __________ times. Complete this exercise __________ times a day.  Exercise D: Pronation  1. Sit with your left / right forearm supported on a table. Your elbow should be at waist height.  2. Rest your hand over the edge of the table, palm-up.  3. Gently grasp a lightweight hammer near the head. As this exercise gets easier for you, try holding the hammer farther down the handle.  4. Without moving your elbow, slowly rotate your palm down.  5. Hold this position for __________ seconds.  6. Slowly return to the starting position.  Repeat __________ times. Complete this exercise __________ times a day.  Exercise E: Elbow flexion, supinated  1. Sit on a stable chair without armrests, or stand.  2. If directed, hold a __________ weight in your left / right hand, or hold an exercise band with both hands. Your palms should face up toward the ceiling at the starting position.  3. Bend your left / right elbow and move your hand up toward your shoulder. Keep your other arm straight down, in the starting position.  4. Slowly return to the starting position.  Repeat __________ times. Complete this exercise __________ times a day.  Exercise F: Elbow extension  1. Lie on your back.  2. Hold a __________ weight in your left / right hand.  3. Bend your left / right elbow to an \"L\" shape (90 degrees) so your elbow is pointed up to the ceiling and the weight is overhead.  4. Straighten your elbow, raising your hand toward the ceiling. Use your other hand to support your left / right upper arm and to keep it still.  5. Slowly return to the starting position.  Repeat __________ times. Complete this exercise __________ times a day.  This information is not intended to replace advice given to you " by your health care provider. Make sure you discuss any questions you have with your health care provider.  Document Released: 12/18/2006 Document Revised: 08/24/2017 Document Reviewed: 11/25/2016  Elsevier Interactive Patient Education © 2017 [employer]

## 2018-03-06 ENCOUNTER — HOSPITAL ENCOUNTER (EMERGENCY)
Facility: HOSPITAL | Age: 63
Discharge: HOME | End: 2018-03-06
Attending: EMERGENCY MEDICINE | Admitting: EMERGENCY MEDICINE

## 2018-03-06 VITALS
HEART RATE: 103 BPM | TEMPERATURE: 97.8 F | RESPIRATION RATE: 18 BRPM | HEIGHT: 72 IN | DIASTOLIC BLOOD PRESSURE: 69 MMHG | OXYGEN SATURATION: 94 % | BODY MASS INDEX: 41.31 KG/M2 | SYSTOLIC BLOOD PRESSURE: 149 MMHG | WEIGHT: 305 LBS

## 2018-03-06 DIAGNOSIS — L30.9 DERMATITIS: Primary | ICD-10-CM

## 2018-03-06 PROCEDURE — 99282 EMERGENCY DEPT VISIT SF MDM: CPT

## 2018-03-06 RX ORDER — TRIAMCINOLONE ACETONIDE 1 MG/G
CREAM TOPICAL 3 TIMES DAILY
Qty: 45 G | Refills: 0 | Status: SHIPPED | OUTPATIENT
Start: 2018-03-06 | End: 2022-08-22

## 2018-03-06 NOTE — DISCHARGE INSTRUCTIONS
Rash  A rash is a change in the color of the skin. A rash can also change the way your skin feels. There are many different conditions and factors that can cause a rash.  Follow these instructions at home:  Pay attention to any changes in your symptoms. Follow these instructions to help with your condition:  Medicine   Take or apply over-the-counter and prescription medicines only as told by your health care provider. These may include:  · Corticosteroid cream.  · Anti-itch lotions.  · Oral antihistamines.  Skin Care   · Apply cool compresses to the affected areas.  · Try taking a bath with:  ¨ Epsom salts. Follow the instructions on the packaging. You can get these at your local pharmacy or grocery store.  ¨ Baking soda. Pour a small amount into the bath as told by your health care provider.  ¨ Colloidal oatmeal. Follow the instructions on the packaging. You can get this at your local pharmacy or grocery store.  · Try applying baking soda paste to your skin. Stir water into baking soda until it reaches a paste-like consistency.  · Do not scratch or rub your skin.  · Avoid covering the rash. Make sure the rash is exposed to air as much as possible.  General instructions   · Avoid hot showers or baths, which can make itching worse. A cold shower may help.  · Avoid scented soaps, detergents, and perfumes. Use gentle soaps, detergents, perfumes, and other cosmetic products.  · Avoid any substance that causes your rash. Keep a journal to help track what causes your rash. Write down:  ¨ What you eat.  ¨ What cosmetic products you use.  ¨ What you drink.  ¨ What you wear. This includes jewelry.  · Keep all follow-up visits as told by your health care provider. This is important.  Contact a health care provider if:  · You sweat at night.  · You lose weight.  · You urinate more than normal.  · You feel weak.  · You vomit.  · Your skin or the whites of your eyes look yellow (jaundice).  · Your skin:  ¨ Tingles.  ¨ Is  numb.  · Your rash:  ¨ Does not go away after several days.  ¨ Gets worse.  · You are:  ¨ Unusually thirsty.  ¨ More tired than normal.  · You have:  ¨ New symptoms.  ¨ Pain in your abdomen.  ¨ A fever.  ¨ Diarrhea.  Get help right away if:  · You develop a rash that covers all or most of your body. The rash may or may not be painful.  · You develop blisters that:  ¨ Are on top of the rash.  ¨ Grow larger or grow together.  ¨ Are painful.  ¨ Are inside your nose or mouth.  · You develop a rash that:  ¨ Looks like purple pinprick-sized spots all over your body.  ¨ Has a “bull's eye” or looks like a target.  ¨ Is not related to sun exposure, is red and painful, and causes your skin to peel.  This information is not intended to replace advice given to you by your health care provider. Make sure you discuss any questions you have with your health care provider.  Document Released: 12/08/2003 Document Revised: 05/23/2017 Document Reviewed: 05/04/2016  Elsevier Interactive Patient Education © 2017 [employer]

## 2018-03-06 NOTE — ED PROVIDER NOTES
Subjective   HPI Comments: 62-year-old male presents to the ER complaining of a pruritic rash over the left ankle region for about 2-3 weeks.  He denies any fever, chills, injury, ulcerations or drainage of any type of fluid from the area.  He said it night he often takes his foot or even shoe and runs it down the area to scratch it because of the itching.  He denies any other involvement other than this specific area.  He denies being diabetic.  He tried some over-the-counter cortisone with no significant relief.      Review of Systems   All other systems reviewed and are negative.      Past Medical History:   Diagnosis Date   • Colon polyp 2012   • Elevated LFTs    • Gout    • Heart murmur    • Hypertension    • Sleep apnea     REPORTS HE TURNED CPAP IN, WAS UNABLE TO WEAR IT   • Snores        No Known Allergies    Past Surgical History:   Procedure Laterality Date   • COLONOSCOPY  2012   • COLONOSCOPY N/A 6/20/2017    Procedure: COLONOSCOPY WITH HOT BIOPSY POLYPECTOMIES;  Surgeon: Rohith Underwood MD;  Location: [facility] ENDOSCOPY;  Service:    • FOOT SURGERY Left        Family History   Problem Relation Age of Onset   • Colon cancer Paternal Uncle        Social History     Social History   • Marital status:      Social History Main Topics   • Smoking status: Former Smoker     Packs/day: 3.00     Years: 30.00     Types: Cigarettes     Quit date: 2011   • Smokeless tobacco: Never Used   • Alcohol use 3.6 oz/week     6 Cans of beer per week      Comment: 6 beers per wk   • Drug use: No   • Sexual activity: Defer           Objective   Physical Exam   Constitutional: He appears well-developed and well-nourished. No distress.   HENT:   Head: Normocephalic.   Eyes: EOM are normal. Right eye exhibits no discharge. Left eye exhibits no discharge. No scleral icterus.   Neck: Normal range of motion.   Cardiovascular: Normal rate and regular rhythm.    Murmur heard.  Pulmonary/Chest: Effort normal and breath sounds  normal. No respiratory distress.   Abdominal: He exhibits no distension.   Neurological: He is alert. No cranial nerve deficit. He exhibits normal muscle tone. Coordination normal.   Skin: Skin is warm. Rash noted. He is not diaphoretic.   He has some faint erythema along the left ankle and dorsum of the foot although does not appear consistent with cellulitis.  There are some excoriations noted, no discrete rash.  No ulcerations, no vesicles, no satellite lesions, nothing with raised borders etc.  Skin is dry appearing.  He has some hyperpigmentation of both ankles consistent with venous stasis changes.  Pedal pulses are intact as is sensation.  No pedal edema.   Psychiatric: He has a normal mood and affect. His behavior is normal. Thought content normal.   Vitals reviewed.      Procedures         ED Course  ED Course   Comment By Time   Given the localized area and appearance of excoriation but not infection, I favor an acute dermatitis.  I'm going to try local steroid and have talked to him about trying to avoid scratching the area and only use a very minimal amount of the steroid cream.  He will need follow-up next week with PCP, I'm also going to give him a dermatology referral Arley Serrano PA-C 03/06 1614                  [facility]    Final diagnoses:   Dermatitis            Arley Serrano PA-C  03/06/18 1612

## 2018-03-26 ENCOUNTER — TELEPHONE (OUTPATIENT)
Dept: GASTROENTEROLOGY | Facility: CLINIC | Age: 63
End: 2018-03-26

## 2018-03-26 NOTE — TELEPHONE ENCOUNTER
REMINDED PATIENT OF UPCOMING APPT AND TO COMPLETE LABS BEFORE APPT. PATIENT VERBALIZED UNDERSTANDING.

## 2018-03-28 ENCOUNTER — LAB (OUTPATIENT)
Dept: LAB | Facility: HOSPITAL | Age: 63
End: 2018-03-28
Attending: INTERNAL MEDICINE

## 2018-03-28 DIAGNOSIS — R79.89 ABNORMAL LFTS: ICD-10-CM

## 2018-03-28 LAB
ALBUMIN SERPL-MCNC: 4.5 G/DL (ref 3.5–5)
ALBUMIN/GLOB SERPL: 1.6 G/DL (ref 1–2)
ALP SERPL-CCNC: 99 U/L (ref 38–126)
ALT SERPL W P-5'-P-CCNC: 65 U/L (ref 13–69)
ANION GAP SERPL CALCULATED.3IONS-SCNC: 20.3 MMOL/L (ref 10–20)
AST SERPL-CCNC: 39 U/L (ref 15–46)
BILIRUB SERPL-MCNC: 0.6 MG/DL (ref 0.2–1.3)
BUN BLD-MCNC: 15 MG/DL (ref 7–20)
BUN/CREAT SERPL: 21.4 (ref 6.3–21.9)
CALCIUM SPEC-SCNC: 9.6 MG/DL (ref 8.4–10.2)
CHLORIDE SERPL-SCNC: 104 MMOL/L (ref 98–107)
CO2 SERPL-SCNC: 23 MMOL/L (ref 26–30)
CREAT BLD-MCNC: 0.7 MG/DL (ref 0.6–1.3)
GFR SERPL CREATININE-BSD FRML MDRD: 115 ML/MIN/1.73
GLOBULIN UR ELPH-MCNC: 2.9 GM/DL
GLUCOSE BLD-MCNC: 110 MG/DL (ref 74–98)
POTASSIUM BLD-SCNC: 4.3 MMOL/L (ref 3.5–5.1)
PROT SERPL-MCNC: 7.4 G/DL (ref 6.3–8.2)
SODIUM BLD-SCNC: 143 MMOL/L (ref 137–145)

## 2018-03-28 PROCEDURE — 36415 COLL VENOUS BLD VENIPUNCTURE: CPT

## 2018-03-28 PROCEDURE — 80053 COMPREHEN METABOLIC PANEL: CPT

## 2018-04-02 ENCOUNTER — OFFICE VISIT (OUTPATIENT)
Dept: GASTROENTEROLOGY | Facility: CLINIC | Age: 63
End: 2018-04-02

## 2018-04-02 VITALS
HEIGHT: 72 IN | BODY MASS INDEX: 41.45 KG/M2 | SYSTOLIC BLOOD PRESSURE: 128 MMHG | TEMPERATURE: 100.3 F | DIASTOLIC BLOOD PRESSURE: 84 MMHG | HEART RATE: 93 BPM | WEIGHT: 306 LBS | RESPIRATION RATE: 20 BRPM

## 2018-04-02 DIAGNOSIS — R79.89 ABNORMAL LFTS: Primary | ICD-10-CM

## 2018-04-02 DIAGNOSIS — E66.3 OVER WEIGHT: ICD-10-CM

## 2018-04-02 DIAGNOSIS — K59.00 CONSTIPATION, UNSPECIFIED CONSTIPATION TYPE: ICD-10-CM

## 2018-04-02 DIAGNOSIS — D12.6 ADENOMATOUS POLYP OF COLON, UNSPECIFIED PART OF COLON: ICD-10-CM

## 2018-04-02 PROCEDURE — 99214 OFFICE O/P EST MOD 30 MIN: CPT | Performed by: INTERNAL MEDICINE

## 2018-04-02 RX ORDER — HYDROXYZINE HYDROCHLORIDE 25 MG/1
TABLET, FILM COATED ORAL
COMMUNITY
Start: 2018-03-22 | End: 2022-08-22

## 2018-04-02 RX ORDER — KETOCONAZOLE 20 MG/ML
SHAMPOO TOPICAL
COMMUNITY
Start: 2018-03-02 | End: 2022-05-19

## 2018-04-02 NOTE — PROGRESS NOTES
Chief Complaint   Patient presents with   • Elevated Hepatic Enzymes      Achieved, can't sit in front of the screen         The patient has history of abnormal liver function tests described as elevation of ALT and AST perhaps for the last 3-4 years. This is described as mild to moderate elevation of the enzymes. There is history of heavy alcohol use over the years.  The patient quit alcohol altogether about 1 year ago. There is no history of pancreatitis. There is no history of jaundice. The patient denies darkening of urine color or pruritus.  The patient came back for follow visit today.  The patient feels ok.  The patient has been having on average one bowel movement a day.  However, stools are rather hard.  There is no history of diarrhea.  He denies further bright red blood per rectum.  There is no history of abdominal pain.  There is no history of overt GI bleed (hematemesis melena or hematochezia).  The patient denies nausea or vomiting. There is no history of reflux.  The patient denies dysphagia or odynophagia.  There is no history of recent significant weight loss.    The patient has history of sleep apnea. He has tried using the CPAP. However, the patient thought that he did not need it. There is significant snoring at night. The patient of note has good energy level. Thereis no family history of colon cancer, inflammatory bowel disease or chronic liver disease.    Review of Systems   Constitutional: Negative for appetite change, chills, fatigue, fever and unexpected weight change.   HENT: Negative for mouth sores, nosebleeds and trouble swallowing.    Eyes: Negative for discharge and redness.   Respiratory: Positive for shortness of breath. Negative for apnea and cough.    Cardiovascular: Negative for chest pain, palpitations and leg swelling.   Gastrointestinal: Negative for abdominal distention, abdominal pain, anal bleeding, blood in stool, constipation, diarrhea, nausea and vomiting.   Endocrine:  Negative for cold intolerance, heat intolerance and polydipsia.   Genitourinary: Negative for dysuria, hematuria and urgency.   Musculoskeletal: Negative for arthralgias, joint swelling and myalgias.   Skin: Negative for rash.   Allergic/Immunologic: Negative for food allergies and immunocompromised state.   Neurological: Negative for dizziness, seizures, syncope and headaches.   Hematological: Negative for adenopathy. Does not bruise/bleed easily.   Psychiatric/Behavioral: Negative for dysphoric mood. The patient is not nervous/anxious and is not hyperactive.      Patient Active Problem List   Diagnosis   • Colon cancer screening   • Bright red blood per rectum   • Elevated liver function tests     Past Medical History:   Diagnosis Date   • Colon polyp 2012   • Elevated LFTs    • Gout    • Heart murmur    • Hypertension    • Sleep apnea     REPORTS HE TURNED CPAP IN, WAS UNABLE TO WEAR IT   • Snores      Past Surgical History:   Procedure Laterality Date   • COLONOSCOPY  2012   • COLONOSCOPY N/A 6/20/2017    Procedure: COLONOSCOPY WITH HOT BIOPSY POLYPECTOMIES;  Surgeon: Rohith Underwood MD;  Location: [facility] ENDOSCOPY;  Service:    • FOOT SURGERY Left      Family History   Problem Relation Age of Onset   • Colon cancer Paternal Uncle      Social History   Substance Use Topics   • Smoking status: Former Smoker     Packs/day: 3.00     Years: 30.00     Types: Cigarettes     Quit date: 2011   • Smokeless tobacco: Never Used   • Alcohol use 3.6 oz/week     6 Cans of beer per week      Comment: 6 beers per wk       Current Outpatient Prescriptions:   •  allopurinol (ZYLOPRIM) 300 MG tablet, Take 300 mg by mouth Daily., Disp: , Rfl:   •  ASPIRIN LOW DOSE 81 MG EC tablet, , Disp: , Rfl:   •  atenolol (TENORMIN) 25 MG tablet, Take 25 mg by mouth Daily., Disp: , Rfl:   •  atorvastatin (LIPITOR) 40 MG tablet, Take 40 mg by mouth Daily., Disp: , Rfl:   •  hydrOXYzine (ATARAX) 25 MG tablet, , Disp: , Rfl:   •  ketoconazole  "(NIZORAL) 2 % shampoo, , Disp: , Rfl:   •  lisinopril (PRINIVIL,ZESTRIL) 20 MG tablet, Take 20 mg by mouth Daily., Disp: , Rfl:   •  meloxicam (MOBIC) 15 MG tablet, Take 1 tablet by mouth Daily., Disp: 20 tablet, Rfl: 0  •  triamcinolone (KENALOG) 0.1 % cream, Apply  topically 3 (Three) Times a Day. Apply a very thin layer of cream over affected area 3 times a day, Disp: 45 g, Rfl: 0  •  meclizine (ANTIVERT) 25 MG tablet, Take 2 tablets by mouth 3 (Three) Times a Day As Needed for dizziness., Disp: 50 tablet, Rfl: 0    No Known Allergies    Blood pressure 128/84, pulse 93, temperature 100.3 °F (37.9 °C), resp. rate 20, height 182.9 cm (72.01\"), weight (!) 139 kg (306 lb).    Physical Exam   Constitutional: He is oriented to person, place, and time. He appears well-developed and well-nourished. No distress.   HENT:   Head: Normocephalic and atraumatic.   Right Ear: Hearing and external ear normal.   Left Ear: Hearing and external ear normal.   Nose: Nose normal.   Mouth/Throat: Oropharynx is clear and moist and mucous membranes are normal. Mucous membranes are not pale, not dry and not cyanotic. No oral lesions. No oropharyngeal exudate.   Eyes: Conjunctivae and EOM are normal. Right eye exhibits no discharge. Left eye exhibits no discharge. No scleral icterus.   Neck: Trachea normal. Neck supple. No JVD present. No edema present. No thyroid mass and no thyromegaly present.   Cardiovascular: Normal rate, regular rhythm, S2 normal and normal heart sounds.  Exam reveals no gallop, no S3 and no friction rub.    No murmur heard.  Pulmonary/Chest: Effort normal and breath sounds normal. No respiratory distress. He has no wheezes. He has no rales. He exhibits no tenderness.   Abdominal: Soft. Normal appearance and bowel sounds are normal. He exhibits no distension, no ascites and no mass. There is no splenomegaly or hepatomegaly. There is no tenderness. There is no rigidity, no rebound and no guarding. No hernia. "   Musculoskeletal: He exhibits no tenderness or deformity.     Vascular Status -  His right foot exhibits no edema. His left foot exhibits no edema.  Lymphadenopathy:     He has no cervical adenopathy.        Left: No supraclavicular adenopathy present.   Neurological: He is alert and oriented to person, place, and time. He has normal strength. No cranial nerve deficit or sensory deficit. He exhibits normal muscle tone. Coordination normal.   Skin: No rash noted. He is not diaphoretic. No cyanosis. No pallor. Nails show no clubbing.   Psychiatric: He has a normal mood and affect. His behavior is normal. Judgment and thought content normal.   Nursing note and vitals reviewed.  Stigmata of chronic liver disease:  None.  Asterixis:  None.    Laboratory Testing:  Upon review of medical records:      Dated 4/21/2017 glucose 103 BUN 18 creatinine 0.8 sodium 137 potassium 4.1 chloride 102 CO2 20 calcium 9.0 albumin 4.4  AST 96 alkaline phosphatase 99 total bilirubin 1.1 WBC 6.5 hemoglobin 15.6 hematocrit 44.9 platelet count 232 MCV 88.9 PTT 24.1 PT-INR 12.0/1.1     Dated September 28, 2017 sodium 141 potassium 3.9 chloride 106 CO2 22 BUN 17 serum creatinine 0.90 glucose 113.  Calcium 9.3.  Albumin 4.20.  T bili 0.3 AST 33 ALT 56 alkaline phosphatase 108.  Hepatitis A IgM negative.  Hepatitis A total antibody positive.  Hepatitis B surface antibody nonreactive.  Hepatitis B surface antigen negative.  Hepatitis B core IgM negative.  Hepatitis B core total antibody negative.  Hepatitis C virus antibody negative at < 0.1.    Dated March 28, 2018 sodium 143 potassium 4.3 chloride 104 CO2 23 BUN 15 serum creatinine 0.70 glucose 110.  Calcium 9.6.  Total protein 7.4.  Albumin 4.50.  T bili 0.6 AST 39 ALT 65 alkaline phosphatase 99.     Abdominal Imaging:  Upon review of records:     CT of abdomen and pelvis without contrast dated 12/3/2015 reveals no renal or ureteral stone or hydronephrosis.  The bladder is normal in  contour.  The liver is low and attenuation consistent with fatty infiltration.  The gallbladder, spleen, pancreas and adrenal glands appear normal.  Bowel gas pattern is nonobstructive.  No focal inflammatory changes are present.  There is no evidence for appendicitis.  There is no bowel wall thickening or mesenteric inflammation.  The aorta and iliac arteries are calcified.     Dated June 1, 2017 the patient underwent an abdominal ultrasound which revealed: Visualized pancreas is unremarkable.  Liver is echogenic consistent with fatty infiltration.  No focal liver lesions are identified. Gallbladder is unremarkable with no shadowing stones or wall thickening.  Common duct measures 3 mm.  The right kidney measures 11.5 cm in length and is normal in echogenicity without hydronephrosis.  Left kidney measures 11.8 cm in length and is normal in echogenicity without hydronephrosis.  Spleen is unremarkable.  Aorta is normal in caliber.  Vena cava is unremarkable.     Procedure:  Upon review of medical records:     Dated June 20, 2017 the patient underwent a colonoscopy to the terminal ileum which revealed: Scant diverticular change in the left colon. Colon polyps.  Internal hemorrhoids.  Descending colon, polyp, biopsy revealed hyperplastic polyp.  Sigmoid colon, polyp, biopsy revealed tubular adenoma.  Colon, hepatic flexure, polyp, biopsy revealed tubular adenoma.  Rectal polyps, biopsies revealed hyperplastic polyps. Benign mucosal prolapse-type polyp.     Assessment:      ICD-10-CM ICD-9-CM   1. Abnormal LFTs R79.89 790.6   2. Constipation, unspecified constipation type K59.00 564.00   3. Adenomatous polyp of colon, unspecified part of colon D12.6 211.3   4. Over weight E66.3 278.02         Discussion:  1. The likely etiology of underlying abnormal liver function tests is alcohol. 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  2. The patient is overweight. His current is BMI 41.4.    Plan/  Patient Instructions    1. Low fat-high-fiber diet with liberal water intake.   2. Weight reduction.  3. Avoid alcohol.  4. Follow-up colonoscopy in 5 years.  June 2022.  5. Check TSH if not already undertaken. This may be pursued through your office.  6. Follow-up in 6 months with CMP.  7. The patient has been advised to use the CPAP.       Rohith Underwood MD

## 2018-04-02 NOTE — PATIENT INSTRUCTIONS
1. Low fat-high-fiber diet with liberal water intake.   2. Weight reduction.  3. Avoid alcohol.  4. Follow-up colonoscopy in 5 years.  June 2022.  5. Check TSH if not already undertaken. This may be pursued through your office.  6. Follow-up in 6 months with CMP.  7. The patient has been advised to use the CPAP.

## 2018-04-04 ENCOUNTER — APPOINTMENT (OUTPATIENT)
Dept: CT IMAGING | Facility: HOSPITAL | Age: 63
End: 2018-04-04

## 2018-04-04 ENCOUNTER — HOSPITAL ENCOUNTER (EMERGENCY)
Facility: HOSPITAL | Age: 63
Discharge: HOME | End: 2018-04-04
Attending: EMERGENCY MEDICINE | Admitting: EMERGENCY MEDICINE

## 2018-04-04 VITALS
BODY MASS INDEX: 41.31 KG/M2 | RESPIRATION RATE: 18 BRPM | DIASTOLIC BLOOD PRESSURE: 71 MMHG | OXYGEN SATURATION: 98 % | TEMPERATURE: 97.7 F | HEIGHT: 72 IN | HEART RATE: 79 BPM | SYSTOLIC BLOOD PRESSURE: 111 MMHG | WEIGHT: 305 LBS

## 2018-04-04 DIAGNOSIS — R42 VERTIGO: Primary | ICD-10-CM

## 2018-04-04 LAB
ANION GAP SERPL CALCULATED.3IONS-SCNC: 17.3 MMOL/L (ref 10–20)
BASOPHILS # BLD AUTO: 0.04 10*3/MM3 (ref 0–0.2)
BASOPHILS NFR BLD AUTO: 0.5 % (ref 0–2.5)
BUN BLD-MCNC: 18 MG/DL (ref 7–20)
BUN/CREAT SERPL: 22.5 (ref 6.3–21.9)
CALCIUM SPEC-SCNC: 9.6 MG/DL (ref 8.4–10.2)
CHLORIDE SERPL-SCNC: 103 MMOL/L (ref 98–107)
CO2 SERPL-SCNC: 25 MMOL/L (ref 26–30)
CREAT BLD-MCNC: 0.8 MG/DL (ref 0.6–1.3)
DEPRECATED RDW RBC AUTO: 43.5 FL (ref 37–54)
EOSINOPHIL # BLD AUTO: 0.15 10*3/MM3 (ref 0–0.7)
EOSINOPHIL NFR BLD AUTO: 1.7 % (ref 0–7)
ERYTHROCYTE [DISTWIDTH] IN BLOOD BY AUTOMATED COUNT: 13.2 % (ref 11.5–14.5)
GFR SERPL CREATININE-BSD FRML MDRD: 98 ML/MIN/1.73
GLUCOSE BLD-MCNC: 115 MG/DL (ref 74–98)
HCT VFR BLD AUTO: 45 % (ref 42–52)
HGB BLD-MCNC: 15.5 G/DL (ref 14–18)
HOLD SPECIMEN: NORMAL
HOLD SPECIMEN: NORMAL
IMM GRANULOCYTES # BLD: 0.02 10*3/MM3 (ref 0–0.06)
IMM GRANULOCYTES NFR BLD: 0.2 % (ref 0–0.6)
LYMPHOCYTES # BLD AUTO: 2.44 10*3/MM3 (ref 0.6–3.4)
LYMPHOCYTES NFR BLD AUTO: 28.2 % (ref 10–50)
MCH RBC QN AUTO: 30.8 PG (ref 27–31)
MCHC RBC AUTO-ENTMCNC: 34.4 G/DL (ref 30–37)
MCV RBC AUTO: 89.3 FL (ref 80–94)
MONOCYTES # BLD AUTO: 0.75 10*3/MM3 (ref 0–0.9)
MONOCYTES NFR BLD AUTO: 8.7 % (ref 0–12)
NEUTROPHILS # BLD AUTO: 5.25 10*3/MM3 (ref 2–6.9)
NEUTROPHILS NFR BLD AUTO: 60.7 % (ref 37–80)
NRBC BLD MANUAL-RTO: 0 /100 WBC (ref 0–0)
PLATELET # BLD AUTO: 217 10*3/MM3 (ref 130–400)
PMV BLD AUTO: 10.5 FL (ref 6–12)
POTASSIUM BLD-SCNC: 4.3 MMOL/L (ref 3.5–5.1)
RBC # BLD AUTO: 5.04 10*6/MM3 (ref 4.7–6.1)
SODIUM BLD-SCNC: 141 MMOL/L (ref 137–145)
WBC NRBC COR # BLD: 8.65 10*3/MM3 (ref 4.8–10.8)
WHOLE BLOOD HOLD SPECIMEN: NORMAL
WHOLE BLOOD HOLD SPECIMEN: NORMAL

## 2018-04-04 PROCEDURE — 93005 ELECTROCARDIOGRAM TRACING: CPT | Performed by: EMERGENCY MEDICINE

## 2018-04-04 PROCEDURE — 99283 EMERGENCY DEPT VISIT LOW MDM: CPT

## 2018-04-04 PROCEDURE — 85007 BL SMEAR W/DIFF WBC COUNT: CPT | Performed by: EMERGENCY MEDICINE

## 2018-04-04 PROCEDURE — 80048 BASIC METABOLIC PNL TOTAL CA: CPT | Performed by: EMERGENCY MEDICINE

## 2018-04-04 PROCEDURE — 70450 CT HEAD/BRAIN W/O DYE: CPT

## 2018-04-04 PROCEDURE — 85025 COMPLETE CBC W/AUTO DIFF WBC: CPT | Performed by: EMERGENCY MEDICINE

## 2018-04-04 RX ORDER — SODIUM CHLORIDE 0.9 % (FLUSH) 0.9 %
10 SYRINGE (ML) INJECTION AS NEEDED
Status: DISCONTINUED | OUTPATIENT
Start: 2018-04-04 | End: 2018-04-04 | Stop reason: HOSPADM

## 2018-04-04 RX ORDER — MECLIZINE HYDROCHLORIDE 25 MG/1
50 TABLET ORAL 3 TIMES DAILY PRN
Qty: 50 TABLET | Refills: 0 | Status: SHIPPED | OUTPATIENT
Start: 2018-04-04 | End: 2022-05-19

## 2018-04-04 RX ORDER — MECLIZINE HYDROCHLORIDE 25 MG/1
50 TABLET ORAL ONCE
Status: DISCONTINUED | OUTPATIENT
Start: 2018-04-04 | End: 2018-04-04 | Stop reason: HOSPADM

## 2018-10-02 ENCOUNTER — OFFICE VISIT (OUTPATIENT)
Dept: GASTROENTEROLOGY | Facility: CLINIC | Age: 63
End: 2018-10-02

## 2018-10-02 VITALS
HEART RATE: 83 BPM | DIASTOLIC BLOOD PRESSURE: 68 MMHG | SYSTOLIC BLOOD PRESSURE: 133 MMHG | RESPIRATION RATE: 18 BRPM | TEMPERATURE: 98.2 F | BODY MASS INDEX: 40.77 KG/M2 | HEIGHT: 72 IN | WEIGHT: 301 LBS

## 2018-10-02 DIAGNOSIS — R79.89 ABNORMAL LIVER FUNCTION TESTS: ICD-10-CM

## 2018-10-02 DIAGNOSIS — K62.5 BRBPR (BRIGHT RED BLOOD PER RECTUM): Primary | ICD-10-CM

## 2018-10-02 DIAGNOSIS — D12.6 ADENOMATOUS POLYP OF COLON, UNSPECIFIED PART OF COLON: ICD-10-CM

## 2018-10-02 PROCEDURE — 99214 OFFICE O/P EST MOD 30 MIN: CPT | Performed by: INTERNAL MEDICINE

## 2018-10-02 NOTE — PATIENT INSTRUCTIONS
1. Low-fat high-fiber diet with liberal water intake.  2. Hemorrhoid care.  3. Weight reduction.  4. The patient may call back in one to 2 weeks regarding progress.  5. CMP. The patient may call back regarding the results in a week's time.  6. Follow-up otherwise in 8 weeks.

## 2018-10-02 NOTE — PROGRESS NOTES
Chief Complaint   Patient presents with   • Rectal Bleeding   • Elevated Hepatic Enzymes     History of Present Illness     The patient has history of bright red blood per rectum off and on for the last one month.  This is described as mild.  Quantity being a couple of drops at a time mostly on the toilet paper.  Frequency being several week or so.  There is no history of associated dizziness.  The patient denies anorectal pain.      The patient has history of abnormal liver function tests described as elevation of ALT and AST perhaps for the last 3-4 years. This is described as mild to moderate elevation of the enzymes. There is history of heavy alcohol use over the years.  The patient quit alcohol altogether about 1 year ago. There is no history of pancreatitis. There is no history of jaundice. The patient denies darkening of urine color or pruritus.  The patient came back for follow visit today.   The patient has been having on average one bowel movement a day.  There is no history of abdominal pain.  There is no history of hematemesis or melena.   The patient denies nausea or vomiting. There is no history of reflux.  The patient denies dysphagia or odynophagia. The patient has history of sleep apnea. He has tried using the CPAP. However, the patient thought that he did not need it. There is significant snoring at night. The patient of note has good energy level. Thereis no family history of colon cancer, inflammatory bowel disease or chronic liver disease. The patient has been trying to lose weight by changing his diet. He has been successful in losing 4 pounds over the last 3-4 months by our scale (the patient claims that he has lost about 7 pounds by home scale).     Review of Systems   Constitutional: Negative for appetite change, chills, fatigue, fever and unexpected weight change.   HENT: Negative for mouth sores, nosebleeds and trouble swallowing.    Eyes: Negative for discharge and redness.   Respiratory:  Positive for shortness of breath. Negative for apnea and cough.    Cardiovascular: Negative for chest pain, palpitations and leg swelling.   Gastrointestinal: Negative for abdominal distention, abdominal pain, anal bleeding, blood in stool, constipation, diarrhea, nausea and vomiting.   Endocrine: Negative for cold intolerance, heat intolerance and polydipsia.   Genitourinary: Negative for dysuria, hematuria and urgency.   Musculoskeletal: Negative for arthralgias, joint swelling and myalgias.   Skin: Negative for rash.   Allergic/Immunologic: Negative for food allergies and immunocompromised state.   Neurological: Negative for dizziness, seizures, syncope and headaches.   Hematological: Negative for adenopathy. Does not bruise/bleed easily.   Psychiatric/Behavioral: Negative for dysphoric mood. The patient is not nervous/anxious and is not hyperactive.      Patient Active Problem List   Diagnosis   • Colon cancer screening   • Bright red blood per rectum   • Elevated liver function tests     Past Medical History:   Diagnosis Date   • Colon polyp 2012   • Elevated LFTs    • Gout    • Heart murmur    • Hypertension    • Sleep apnea     REPORTS HE TURNED CPAP IN, WAS UNABLE TO WEAR IT   • Snores      Past Surgical History:   Procedure Laterality Date   • COLONOSCOPY  2012   • COLONOSCOPY N/A 6/20/2017    Procedure: COLONOSCOPY WITH HOT BIOPSY POLYPECTOMIES;  Surgeon: Rohith Underwood MD;  Location: [facility] ENDOSCOPY;  Service:    • FOOT SURGERY Left      Family History   Problem Relation Age of Onset   • Colon cancer Paternal Uncle      Social History   Substance Use Topics   • Smoking status: Former Smoker     Packs/day: 3.00     Years: 30.00     Types: Cigarettes     Quit date: 2011   • Smokeless tobacco: Never Used   • Alcohol use 3.6 oz/week     6 Cans of beer per week      Comment: 6 beers per wk       Current Outpatient Prescriptions:   •  allopurinol (ZYLOPRIM) 300 MG tablet, Take 300 mg by mouth Daily., Disp: ,  "Rfl:   •  ASPIRIN LOW DOSE 81 MG EC tablet, , Disp: , Rfl:   •  atenolol (TENORMIN) 25 MG tablet, Take 25 mg by mouth Daily., Disp: , Rfl:   •  atorvastatin (LIPITOR) 40 MG tablet, Take 40 mg by mouth Daily., Disp: , Rfl:   •  hydrOXYzine (ATARAX) 25 MG tablet, , Disp: , Rfl:   •  ketoconazole (NIZORAL) 2 % shampoo, , Disp: , Rfl:   •  lisinopril (PRINIVIL,ZESTRIL) 20 MG tablet, Take 20 mg by mouth Daily., Disp: , Rfl:   •  meclizine (ANTIVERT) 25 MG tablet, Take 2 tablets by mouth 3 (Three) Times a Day As Needed for dizziness., Disp: 50 tablet, Rfl: 0  •  meloxicam (MOBIC) 15 MG tablet, Take 1 tablet by mouth Daily., Disp: 20 tablet, Rfl: 0  •  triamcinolone (KENALOG) 0.1 % cream, Apply  topically 3 (Three) Times a Day. Apply a very thin layer of cream over affected area 3 times a day, Disp: 45 g, Rfl: 0    No Known Allergies    Blood pressure 133/68, pulse 83, temperature 98.2 °F (36.8 °C), resp. rate 18, height 182.9 cm (72.01\"), weight (!) 137 kg (301 lb).    Physical Exam   Constitutional: He is oriented to person, place, and time. He appears well-developed and well-nourished. No distress.   HENT:   Head: Normocephalic and atraumatic.   Right Ear: Hearing and external ear normal.   Left Ear: Hearing and external ear normal.   Nose: Nose normal.   Mouth/Throat: Oropharynx is clear and moist and mucous membranes are normal. Mucous membranes are not pale, not dry and not cyanotic. No oral lesions. No oropharyngeal exudate.   Eyes: Conjunctivae and EOM are normal. Right eye exhibits no discharge. Left eye exhibits no discharge. No scleral icterus.   Neck: Trachea normal. Neck supple. No JVD present. No edema present. No thyroid mass and no thyromegaly present.   Cardiovascular: Normal rate, regular rhythm, S2 normal and normal heart sounds.  Exam reveals no gallop, no S3 and no friction rub.    No murmur heard.  Pulmonary/Chest: Effort normal and breath sounds normal. No respiratory distress. He has no wheezes. He " has no rales. He exhibits no tenderness.   Abdominal: Soft. Normal appearance and bowel sounds are normal. He exhibits no distension, no ascites and no mass. There is no splenomegaly or hepatomegaly. There is no tenderness. There is no rigidity, no rebound and no guarding. No hernia.   Musculoskeletal: He exhibits no tenderness or deformity.     Vascular Status -  His right foot exhibits no edema. His left foot exhibits no edema.  Lymphadenopathy:     He has no cervical adenopathy.        Left: No supraclavicular adenopathy present.   Neurological: He is alert and oriented to person, place, and time. He has normal strength. No cranial nerve deficit or sensory deficit. He exhibits normal muscle tone. Coordination normal.   Skin: No rash noted. He is not diaphoretic. No cyanosis. No pallor. Nails show no clubbing.   Psychiatric: He has a normal mood and affect. His behavior is normal. Judgment and thought content normal.   Nursing note and vitals reviewed.  Stigmata of chronic liver disease:  None.  Asterixis:  None.    Laboratory Testing:  Upon review of medical records:    Dated 4/21/2017 glucose 103 BUN 18 creatinine 0.8 sodium 137 potassium 4.1 chloride 102 CO2 20 calcium 9.0 albumin 4.4  AST 96 alkaline phosphatase 99 total bilirubin 1.1 WBC 6.5 hemoglobin 15.6 hematocrit 44.9 platelet count 232 MCV 88.9 PTT 24.1 PT-INR 12.0/1.1     Dated September 28, 2017 sodium 141 potassium 3.9 chloride 106 CO2 22 BUN 17 serum creatinine 0.90 glucose 113.  Calcium 9.3.  Albumin 4.20.  T bili 0.3 AST 33 ALT 56 alkaline phosphatase 108.  Hepatitis A IgM negative.  Hepatitis A total antibody positive.  Hepatitis B surface antibody nonreactive.  Hepatitis B surface antigen negative.  Hepatitis B core IgM negative.  Hepatitis B core total antibody negative.  Hepatitis C virus antibody negative at < 0.1.     Dated March 28, 2018 sodium 143 potassium 4.3 chloride 104 CO2 23 BUN 15 serum creatinine 0.70 glucose 110.  Calcium  9.6.  Total protein 7.4.  Albumin 4.50.  T bili 0.6 AST 39 ALT 65 alkaline phosphatase 99.    Dated April 4, 2018 sodium 141 potassium 4.3 chloride 103 CO2 25 BUN 18 serum creatinine 0.80 glucose 115.  Calcium 9.6.  WBC 8.65 hemoglobin 15.5 hematocrit 45.0 MCV 89.3 and platelet count 217.     Abdominal Imaging:  Upon review of records:     CT of abdomen and pelvis without contrast dated 12/3/2015 reveals no renal or ureteral stone or hydronephrosis.  The bladder is normal in contour.  The liver is low and attenuation consistent with fatty infiltration.  The gallbladder, spleen, pancreas and adrenal glands appear normal.  Bowel gas pattern is nonobstructive.  No focal inflammatory changes are present.  There is no evidence for appendicitis.  There is no bowel wall thickening or mesenteric inflammation.  The aorta and iliac arteries are calcified.     Dated June 1, 2017 the patient underwent an abdominal ultrasound which revealed: Visualized pancreas is unremarkable.  Liver is echogenic consistent with fatty infiltration.  No focal liver lesions are identified. Gallbladder is unremarkable with no shadowing stones or wall thickening.  Common duct measures 3 mm. The right kidney measures 11.5 cm in length and is normal in echogenicity without hydronephrosis.  Left kidney measures 11.8 cm in length and is normal in echogenicity without hydronephrosis.  Spleen is unremarkable.  Aorta is normal in caliber.  Vena cava is unremarkable.     Procedure:  Upon review of medical records:     Dated June 20, 2017 the patient underwent a colonoscopy to the terminal ileum which revealed: Scant diverticular change in the left colon. Colon polyps.  Internal hemorrhoids.  Descending colon, polyp, biopsy revealed hyperplastic polyp.  Sigmoid colon, polyp, biopsy revealed tubular adenoma.  Colon, hepatic flexure, polyp, biopsy revealed tubular adenoma.  Rectal polyps, biopsies revealed hyperplastic polyps. Benign mucosal prolapse-type  polyp.     Assessment:      ICD-10-CM ICD-9-CM   1. BRBPR (bright red blood per rectum) K62.5 569.3   2. Abnormal liver function tests R94.5 790.6   3. Adenomatous polyp of colon, unspecified part of colon D12.6 211.3         Discussion:  1.     Plan/  Patient Instructions   1. Low-fat high-fiber diet with liberal water intake.  2. Hemorrhoid care.  3. Weight reduction.  4. The patient may call back in one to 2 weeks regarding progress.  5. CMP. The patient may call back regarding the results in a week's time.  6. Follow-up otherwise in 8 weeks.       Rohith Underwood MD

## 2019-04-11 ENCOUNTER — TELEPHONE (OUTPATIENT)
Dept: GASTROENTEROLOGY | Facility: CLINIC | Age: 64
End: 2019-04-11

## 2019-05-22 NOTE — ED PROVIDER NOTES
Differential is remains uncertain whether this is tumor glioma or primary infectious or some combination.  Appreciated with neurology and neurosurgery and infectious disease.  Plan is in addition to watchful waiting observation is continue antibiotics for a full 4 to 6-week course empirically.  We also await pathology results from the most recent craniotomy.   Subjective   62-year-old male presenting with dizziness.  States that for the last 2 days he has had intermittent dizziness, worse when he moves his head or bends over.  Symptoms are very short-lived, spontaneously resolved.  He has been sick with some upper respiratory congestion and sinus pressure.  He denies any fevers, chills, nausea, vomiting, chest pain, shortness of breath, palpitations.  He has never had symptoms like this before.  He has no numbness or weakness.            Review of Systems   Constitutional: Negative for chills and fever.   HENT: Positive for congestion and sinus pressure. Negative for rhinorrhea and sore throat.    Eyes: Negative for pain.   Respiratory: Negative for cough and shortness of breath.    Cardiovascular: Negative for chest pain, palpitations and leg swelling.   Gastrointestinal: Negative for abdominal pain, diarrhea, nausea and vomiting.   Genitourinary: Negative for dysuria.   Musculoskeletal: Negative for arthralgias.   Skin: Negative for rash.   Neurological: Positive for dizziness. Negative for seizures, syncope, facial asymmetry, speech difficulty, weakness, light-headedness, numbness and headaches.   Psychiatric/Behavioral: Negative for behavioral problems.       Past Medical History:   Diagnosis Date   • Colon polyp 2012   • Elevated LFTs    • Gout    • Heart murmur    • Hypertension    • Sleep apnea     REPORTS HE TURNED CPAP IN, WAS UNABLE TO WEAR IT   • Snores        No Known Allergies    Past Surgical History:   Procedure Laterality Date   • COLONOSCOPY  2012   • COLONOSCOPY N/A 6/20/2017    Procedure: COLONOSCOPY WITH HOT BIOPSY POLYPECTOMIES;  Surgeon: Rohith Underwood MD;  Location: [facility] ENDOSCOPY;  Service:    • FOOT SURGERY Left        Family History   Problem Relation Age of Onset   • Colon cancer Paternal Uncle        Social History     Social History   • Marital status:      Social History Main Topics   • Smoking status: Former Smoker     Packs/day:  3.00     Years: 30.00     Types: Cigarettes     Quit date: 2011   • Smokeless tobacco: Never Used   • Alcohol use 3.6 oz/week     6 Cans of beer per week      Comment: 6 beers per wk   • Drug use: No   • Sexual activity: Defer     Other Topics Concern   • Not on file           Objective   Physical Exam   Constitutional: He is oriented to person, place, and time. He appears well-developed and well-nourished. No distress.   HENT:   Head: Normocephalic and atraumatic.   Right Ear: External ear normal.   Left Ear: External ear normal.   Nose: Nose normal.   Mouth/Throat: Oropharynx is clear and moist.   TMs clear, mild congestion   Eyes: Conjunctivae and EOM are normal. Pupils are equal, round, and reactive to light.   Neck: Normal range of motion. Neck supple.   Cardiovascular: Normal rate, regular rhythm, normal heart sounds and intact distal pulses.    Pulmonary/Chest: Effort normal and breath sounds normal. No respiratory distress.   Abdominal: Soft. Bowel sounds are normal. He exhibits no distension. There is no tenderness. There is no rebound and no guarding.   Musculoskeletal: Normal range of motion. He exhibits no edema, tenderness or deformity.   Neurological: He is alert and oriented to person, place, and time.   Symptoms reproduced with movement of head side to side, very brief horizontal nystagmus, cranial nerves are intact, normal strength and sensation bilateral upper and lower extremities, head impulse and test of skew unremarkable   Skin: Skin is warm and dry. No rash noted.   Psychiatric: He has a normal mood and affect. His behavior is normal.   Nursing note and vitals reviewed.      Procedures         ED Course  ED Course                  MDM  Number of Diagnoses or Management Options  Vertigo:   Diagnosis management comments: 62-year-old male with dizziness.  Well-developed, well-nourished man in no distress with normal vital signs and exam as above.  Based on his symptoms and exam I think he likely  has peripheral vertigo.  Could be from upper respiratory infection.  We'll check labs and CT scan of his head.  We'll treat symptomatically.  Disposition pending workup though anticipate discharge home.    DDX: BPPV, viral illness, upper respiratory infection     EKG: Sinus rhythm, normal rate, normal axis/intervals, no acute ST/T changes    Work up is unremarkable. He is feeling better. At this time I feel he is safe for discharge home. Encouraged him to follow up closely with his primary and ENT, neurology if needed. He is happy and comfortable with the plan.       Amount and/or Complexity of Data Reviewed  Clinical lab tests: reviewed  Tests in the radiology section of CPT®: reviewed        Final diagnoses:   Vertigo            Suraj Moulton MD  04/04/18 1002

## 2019-12-28 ENCOUNTER — HOSPITAL ENCOUNTER (EMERGENCY)
Facility: HOSPITAL | Age: 64
Discharge: HOME | End: 2019-12-28
Attending: EMERGENCY MEDICINE | Admitting: EMERGENCY MEDICINE

## 2019-12-28 VITALS
HEIGHT: 72 IN | RESPIRATION RATE: 18 BRPM | SYSTOLIC BLOOD PRESSURE: 130 MMHG | TEMPERATURE: 97.8 F | BODY MASS INDEX: 39.58 KG/M2 | WEIGHT: 292.2 LBS | OXYGEN SATURATION: 94 % | HEART RATE: 100 BPM | DIASTOLIC BLOOD PRESSURE: 82 MMHG

## 2019-12-28 DIAGNOSIS — R21 RASH: Primary | ICD-10-CM

## 2019-12-28 PROCEDURE — 96372 THER/PROPH/DIAG INJ SC/IM: CPT

## 2019-12-28 PROCEDURE — 99282 EMERGENCY DEPT VISIT SF MDM: CPT

## 2019-12-28 PROCEDURE — 25010000002 DEXAMETHASONE PER 1 MG: Performed by: EMERGENCY MEDICINE

## 2019-12-28 RX ORDER — DEXAMETHASONE SODIUM PHOSPHATE 10 MG/ML
10 INJECTION INTRAMUSCULAR; INTRAVENOUS ONCE
Status: COMPLETED | OUTPATIENT
Start: 2019-12-28 | End: 2019-12-28

## 2019-12-28 RX ADMIN — DEXAMETHASONE SODIUM PHOSPHATE 10 MG: 10 INJECTION INTRAMUSCULAR; INTRAVENOUS at 05:22

## 2021-06-04 ENCOUNTER — TELEPHONE (OUTPATIENT)
Dept: SURGERY | Facility: CLINIC | Age: 66
End: 2021-06-04

## 2021-06-04 RX ORDER — BISACODYL 5 MG
TABLET, DELAYED RELEASE (ENTERIC COATED) ORAL
Qty: 4 TABLET | Refills: 0 | Status: SHIPPED | OUTPATIENT
Start: 2021-06-04 | End: 2022-05-19

## 2021-06-04 RX ORDER — POLYETHYLENE GLYCOL 3350 17 G/17G
POWDER, FOR SOLUTION ORAL
Qty: 238 G | Refills: 0 | Status: SHIPPED | OUTPATIENT
Start: 2021-06-04 | End: 2022-05-19

## 2021-06-04 NOTE — TELEPHONE ENCOUNTER
Pt scheduled at [facility] for colonscopy on 7/14/21, instructions mailed, prep sent in.

## 2021-06-04 NOTE — TELEPHONE ENCOUNTER
----- Message from Saud Santos MD sent at 6/4/2021  2:15 PM EDT -----  Regarding: RE: case request  I just 1 to make sure you got the message.  Dr. Underwood recommend a repeat colonoscopy in June 2022.  Unless he has colon issues such as bleeding I recommend repeating the colonoscopy 5 years from the last as well.  ----- Message -----  From: Milligan, Michelle L, MA  Sent: 6/4/2021   2:04 PM EDT  To: Audie Cervantes, Saud Santos MD  Subject: case request                                     Colon-last with Underwood in 2017 with tubular adenoma  [facility]  7/14/21

## 2021-06-04 NOTE — TELEPHONE ENCOUNTER
PRESCREENING FOR OPEN ACCESS SCHEDULING    Damion Todd JrIsai, 1955  [id number]    06/04/21    If, the patient answers yes to any of the following questions the provider will be informed prior to scheduling open access for approval and documented in the chart.    [x]  Yes  [] No    1. Have you ever had a colonoscopy in the past?      When:  5yrs ago      Where:  [facility]      Polyps or other:     []  Yes  [x] No    2. Family history of colon cancer?      Relation:       Age of onset:       Do you currently have any of the following?    []  Yes  [x] No  Rectal bleeding, if so, how long?     []  Yes  [x] No  Abdominal pain, if so, how long?    []  Yes  [x] No  Constipation, if so, how long?    []  Yes  [x] No  Diarrhea, if so, how long?    []  Yes  [x] No  Weight loss, is so, how much?    [] Yes  [x] No  Small caliber stool, if so, how long?      Have you ever had any of the following conditions?    [] Yes  [x] No  Heart attack?      When?       Last cardiac workup?     Blood thinners?    [] Yes  [x] No   Lung problems, asthma or COPD?  [] Yes  [x] No  Oxygen required?       [] Yes  [x] No  Stroke?     [] Yes  [x] No  Have you ever had a reaction to anesthesia?

## 2022-05-16 RX ORDER — LEVOTHYROXINE SODIUM 0.05 MG/1
50 TABLET ORAL DAILY
COMMUNITY

## 2022-05-17 ENCOUNTER — APPOINTMENT (OUTPATIENT)
Dept: GENERAL RADIOLOGY | Facility: HOSPITAL | Age: 67
End: 2022-05-17

## 2022-05-17 ENCOUNTER — HOSPITAL ENCOUNTER (EMERGENCY)
Facility: HOSPITAL | Age: 67
Discharge: HOME | End: 2022-05-17
Attending: EMERGENCY MEDICINE | Admitting: EMERGENCY MEDICINE

## 2022-05-17 VITALS
HEIGHT: 72 IN | HEART RATE: 100 BPM | BODY MASS INDEX: 40.63 KG/M2 | OXYGEN SATURATION: 95 % | TEMPERATURE: 98.2 F | DIASTOLIC BLOOD PRESSURE: 63 MMHG | WEIGHT: 300 LBS | SYSTOLIC BLOOD PRESSURE: 150 MMHG | RESPIRATION RATE: 20 BRPM

## 2022-05-17 DIAGNOSIS — S60.551A FOREIGN BODY OF RIGHT HAND, INITIAL ENCOUNTER: Primary | ICD-10-CM

## 2022-05-17 PROCEDURE — 99283 EMERGENCY DEPT VISIT LOW MDM: CPT

## 2022-05-17 PROCEDURE — 73130 X-RAY EXAM OF HAND: CPT

## 2022-05-17 PROCEDURE — 90471 IMMUNIZATION ADMIN: CPT | Performed by: NURSE PRACTITIONER

## 2022-05-17 PROCEDURE — 90715 TDAP VACCINE 7 YRS/> IM: CPT | Performed by: NURSE PRACTITIONER

## 2022-05-17 PROCEDURE — 25010000002 TETANUS-DIPHTH-ACELL PERTUSSIS 5-2.5-18.5 LF-MCG/0.5 SUSPENSION PREFILLED SYRINGE: Performed by: NURSE PRACTITIONER

## 2022-05-17 PROCEDURE — 0 LIDOCAINE 1 % SOLUTION: Performed by: NURSE PRACTITIONER

## 2022-05-17 RX ORDER — LIDOCAINE HYDROCHLORIDE 10 MG/ML
10 INJECTION, SOLUTION INFILTRATION; PERINEURAL ONCE
Status: COMPLETED | OUTPATIENT
Start: 2022-05-17 | End: 2022-05-17

## 2022-05-17 RX ADMIN — TETANUS TOXOID, REDUCED DIPHTHERIA TOXOID AND ACELLULAR PERTUSSIS VACCINE, ADSORBED 0.5 ML: 5; 2.5; 8; 8; 2.5 SUSPENSION INTRAMUSCULAR at 14:08

## 2022-05-17 RX ADMIN — LIDOCAINE HYDROCHLORIDE 10 ML: 10 INJECTION, SOLUTION INFILTRATION; PERINEURAL at 14:32

## 2022-05-17 NOTE — ED PROVIDER NOTES
Subjective   Chief Complaint: [place] in finger    History of Present Illness: This is a 66-year-old male patient comes into the ED today with a fishhook in his finger of his right hand.  Patient stated that he tried to push it through but was unable to so he cut the fishhook self off so he can get his keys to drive to the ER    Nurses Notes reviewed and agree, including vitals, allergies, social history and prior medical history.                Review of Systems   Skin: Positive for wound.   All other systems reviewed and are negative.      Past Medical History:   Diagnosis Date   • Basal cell carcinoma    • Colon polyp    • DJD (degenerative joint disease), ankle and foot, left    • Elevated LFTs    • Gout    • Heart murmur    • Hyperlipidemia    • Hypertension    • JUAN (obstructive sleep apnea)    • Sleep apnea     REPORTS HE TURNED CPAP IN, WAS UNABLE TO WEAR IT   • Snores        No Known Allergies    Past Surgical History:   Procedure Laterality Date   • COLONOSCOPY     • COLONOSCOPY N/A 2017    Procedure: COLONOSCOPY WITH HOT BIOPSY POLYPECTOMIES;  Surgeon: Rohith Underwood MD;  Location: [facility] ENDOSCOPY;  Service:    • FOOT SURGERY Left        Family History   Problem Relation Age of Onset   • Colon cancer Paternal Uncle        Social History     Socioeconomic History   • Marital status:    Tobacco Use   • Smoking status: Former Smoker     Packs/day: 3.00     Years: 30.00     Pack years: 90.00     Types: Cigarettes     Quit date:      Years since quittin.3   • Smokeless tobacco: Never Used   Substance and Sexual Activity   • Alcohol use: Yes     Alcohol/week: 6.0 standard drinks     Types: 6 Cans of beer per week     Comment: 6 beers per DAY   • Drug use: No   • Sexual activity: Defer           Objective   Physical Exam  Vitals and nursing note reviewed.   Constitutional:       Appearance: Normal appearance.   HENT:      Head: Normocephalic and atraumatic.   Eyes:      Extraocular  Movements: Extraocular movements intact.      Pupils: Pupils are equal, round, and reactive to light.   Cardiovascular:      Rate and Rhythm: Normal rate and regular rhythm.      Pulses: Normal pulses.      Heart sounds: Normal heart sounds.   Pulmonary:      Effort: Pulmonary effort is normal.      Breath sounds: Normal breath sounds.   Abdominal:      General: Bowel sounds are normal.      Palpations: Abdomen is soft.   Musculoskeletal:         General: Normal range of motion.      Cervical back: Normal range of motion and neck supple.   Skin:     General: Skin is warm and dry.      Capillary Refill: Capillary refill takes less than 2 seconds.      Comments: [place] embedded to distal tip of right middle finger   Neurological:      Mental Status: He is alert.      GCS: GCS eye subscore is 4. GCS verbal subscore is 5. GCS motor subscore is 6.      Cranial Nerves: Cranial nerves are intact.      Sensory: Sensation is intact.      Motor: Motor function is intact.   Psychiatric:         Attention and Perception: Attention and perception normal.         Mood and Affect: Mood and affect normal.         Speech: Speech normal.         Foreign Body Removal - Embedded    Date/Time: 5/17/2022 2:35 PM  Performed by: Emir Hardin APRN  Authorized by: Suraj Moulton MD     Consent:     Consent obtained:  Verbal    Consent given by:  Patient    Risks, benefits, and alternatives were discussed: yes      Risks discussed:  Bleeding, infection, pain, incomplete removal, nerve damage and poor cosmetic result    Alternatives discussed:  No treatment, alternative treatment and observation  Universal protocol:     Procedure explained and questions answered to patient or proxy's satisfaction: yes      Relevant documents present and verified: yes      Test results available: yes      Imaging studies available: yes      Required blood products, implants, devices, and special equipment available: yes      Site/side marked: yes       Immediately prior to procedure, a time out was called: yes      Patient identity confirmed:  Verbally with patient  Location:     Location:  Finger    Finger location:  R middle finger    Depth:  Subcutaneous    Tendon involvement:  None  Pre-procedure details:     Imaging:  X-ray    Neurovascular status: intact      Preparation: Patient was prepped and draped in usual sterile fashion    Anesthesia:     Anesthesia method:  None  Procedure type:     Procedure complexity:  Simple  Procedure details:     Localization method:  Visualized    Dissection of underlying tissues: no      Bloodless field: yes      Removal mechanism:  Forceps    Foreign bodies recovered:  1    Intact foreign body removal: yes    Post-procedure details:     Neurovascular status: intact      Confirmation:  No additional foreign bodies on visualization    Skin closure:  None    Dressing:  Non-adherent dressing    Procedure completion:  Tolerated well, no immediate complications               ED Course                                                 MDM    Final diagnoses:   Foreign body of right hand, initial encounter       ED Disposition  ED Disposition     ED Disposition   Discharge    Condition   Stable    Comment   --             Yasir Dougherty MD  [address] [place] [id number] [phone]    In 1 week  For wound re-check         Medication List      No changes were made to your prescriptions during this visit.          Emir Hardin, APRN  05/17/22 1431

## 2022-05-19 ENCOUNTER — OFFICE VISIT (OUTPATIENT)
Dept: GASTROENTEROLOGY | Facility: CLINIC | Age: 67
End: 2022-05-19

## 2022-05-19 VITALS
WEIGHT: 306 LBS | HEIGHT: 73 IN | HEART RATE: 114 BPM | TEMPERATURE: 98.4 F | SYSTOLIC BLOOD PRESSURE: 134 MMHG | BODY MASS INDEX: 40.56 KG/M2 | DIASTOLIC BLOOD PRESSURE: 101 MMHG

## 2022-05-19 DIAGNOSIS — K76.0 NAFLD (NONALCOHOLIC FATTY LIVER DISEASE): ICD-10-CM

## 2022-05-19 DIAGNOSIS — Z86.010 HISTORY OF ADENOMATOUS POLYP OF COLON: Primary | ICD-10-CM

## 2022-05-19 DIAGNOSIS — E66.01 CLASS 3 SEVERE OBESITY WITH BODY MASS INDEX (BMI) OF 40.0 TO 44.9 IN ADULT, UNSPECIFIED OBESITY TYPE, UNSPECIFIED WHETHER SERIOUS COMORBIDITY PRESENT: ICD-10-CM

## 2022-05-19 DIAGNOSIS — Z80.0 FAMILY HISTORY OF COLON CANCER: ICD-10-CM

## 2022-05-19 PROCEDURE — 99214 OFFICE O/P EST MOD 30 MIN: CPT | Performed by: PHYSICIAN ASSISTANT

## 2022-05-19 RX ORDER — BISACODYL 5 MG
TABLET, DELAYED RELEASE (ENTERIC COATED) ORAL
Qty: 4 TABLET | Refills: 0 | Status: SHIPPED | OUTPATIENT
Start: 2022-05-19 | End: 2022-08-22

## 2022-05-19 RX ORDER — POLYETHYLENE GLYCOL 3350 17 G/17G
POWDER, FOR SOLUTION ORAL
Qty: 238 G | Refills: 0 | Status: SHIPPED | OUTPATIENT
Start: 2022-05-19 | End: 2022-08-22

## 2022-05-19 RX ORDER — SODIUM CHLORIDE 9 MG/ML
30 INJECTION, SOLUTION INTRAVENOUS CONTINUOUS PRN
Status: CANCELLED | OUTPATIENT
Start: 2022-05-19

## 2022-05-19 NOTE — PROGRESS NOTES
New Patient Consult      Date: 2022   Patient Name: Damion Todd Jr.  MRN: [id number]  : 1955     Primary Care Provider: Yasir Dougherty MD  Referring Provider: [place]    Chief Complaint   Patient presents with   • Colon Polyps     History of Present Illness: Damion Todd Jr. is a 66 y.o. male who is here today as a consultation with Gastroenterology for evaluation of Colon Polyps    His last colonoscopy was with Dr. Underwood in .  He has a personal history of colon polyps.  He was asked to repeat colonoscopy in 5 years and would like to schedule now.  He has known hemorrhoids and reports a small amount of bright red rectal bleeding on the tissue intermittently.  No rectal pain.  Denies any acid reflux symptoms or dysphagia.  He has no history of fatty liver disease, drinks alcohol occasionally in small amounts.  He had recent labs with his PCP but is unsure of those results.  No current abdominal pain, no changes in bowel movements.  He has daily regular bowel movements, no constipation or diarrhea.    Subjective      Past Medical History:   Diagnosis Date   • Basal cell carcinoma    • Colon polyp    • DJD (degenerative joint disease), ankle and foot, left    • Elevated LFTs    • Gout    • Heart murmur    • Hyperlipidemia    • Hypertension    • JUAN (obstructive sleep apnea)    • Sleep apnea     REPORTS HE TURNED CPAP IN, WAS UNABLE TO WEAR IT   • Snores      Past Surgical History:   Procedure Laterality Date   • COLONOSCOPY     • COLONOSCOPY N/A 2017    Procedure: COLONOSCOPY WITH HOT BIOPSY POLYPECTOMIES;  Surgeon: Rohith Underwood MD;  Location: [facility] ENDOSCOPY;  Service:    • FOOT SURGERY Left      Family History   Problem Relation Age of Onset   • Liver disease Mother    • Alcohol abuse Mother    • Cirrhosis Mother    • Colon cancer Paternal Uncle      Social History     Socioeconomic History   • Marital status:    Tobacco Use   • Smoking status: Former  Smoker     Packs/day: 3.00     Years: 30.00     Pack years: 90.00     Types: Cigarettes     Quit date:      Years since quittin.3   • Smokeless tobacco: Never Used   Vaping Use   • Vaping Use: Never used   Substance and Sexual Activity   • Alcohol use: Yes     Alcohol/week: 6.0 standard drinks     Types: 6 Cans of beer per week     Comment: 6-7 beers per day   • Drug use: No   • Sexual activity: Defer     Current Outpatient Medications:   •  allopurinol (ZYLOPRIM) 300 MG tablet, Take 300 mg by mouth Daily., Disp: , Rfl:   •  ASPIRIN LOW DOSE 81 MG EC tablet, , Disp: , Rfl:   •  atenolol (TENORMIN) 25 MG tablet, Take 25 mg by mouth Daily., Disp: , Rfl:   •  atorvastatin (LIPITOR) 40 MG tablet, Take 40 mg by mouth Daily., Disp: , Rfl:   •  hydrOXYzine (ATARAX) 25 MG tablet, , Disp: , Rfl:   •  levothyroxine (SYNTHROID, LEVOTHROID) 50 MCG tablet, Take 50 mcg by mouth Daily., Disp: , Rfl:   •  lisinopril (PRINIVIL,ZESTRIL) 20 MG tablet, Take 20 mg by mouth Daily., Disp: , Rfl:   •  triamcinolone (KENALOG) 0.1 % cream, Apply  topically 3 (Three) Times a Day. Apply a very thin layer of cream over affected area 3 times a day, Disp: 45 g, Rfl: 0    No Known Allergies    The following portions of the patient's history were reviewed and updated as appropriate: allergies, current medications, past family history, past medical history, past social history, past surgical history and problem list.    Objective     Physical Exam  Vitals reviewed.   Constitutional:       General: He is not in acute distress.     Appearance: Normal appearance. He is well-developed. He is obese. He is not ill-appearing or diaphoretic.   HENT:      Head: Normocephalic and atraumatic.      Right Ear: External ear normal.      Left Ear: External ear normal.      Nose: Nose normal.      Mouth/Throat:      Comments: Wearing a mask  Eyes:      General: No scleral icterus.        Right eye: No discharge.         Left eye: No discharge.       "Conjunctiva/sclera: Conjunctivae normal.   Neck:      Vascular: No JVD.   Cardiovascular:      Rate and Rhythm: Tachycardia present.      Heart sounds: Murmur heard.     No friction rub. No gallop.      Comments: ?irregular rhythm  Pulmonary:      Effort: Pulmonary effort is normal. No respiratory distress.      Breath sounds: Normal breath sounds. No wheezing or rales.   Chest:      Chest wall: No tenderness.   Abdominal:      General: Bowel sounds are normal. There is no distension.      Palpations: Abdomen is soft. There is no mass.      Tenderness: There is no abdominal tenderness. There is no guarding.   Musculoskeletal:         General: No deformity. Normal range of motion.      Cervical back: Normal range of motion.   Skin:     General: Skin is warm and dry.      Findings: No erythema or rash.   Neurological:      Mental Status: He is alert and oriented to person, place, and time.      Coordination: Coordination normal.   Psychiatric:         Mood and Affect: Mood normal.         Behavior: Behavior normal.         Thought Content: Thought content normal.         Judgment: Judgment normal.         Vitals:    05/19/22 1055   BP: (!) 134/101   Pulse: 114   Temp: 98.4 °F (36.9 °C)   TempSrc: Infrared   Weight: (!) 139 kg (306 lb)   Height: 185.4 cm (73\")     Results Review:   I have reviewed the patient's new clinical and imaging results.    No recent labs to review.     Colonoscopy with Dr. Underwood 6/20/2017:  Scant diverticular change of the colon, colon polyps, internal hemorrhoids.  Pathology showed ascending colon polyp to be hyperplastic polyp, sigmoid polyp was tubular adenoma, hepatic flexure polyp was tubular adenoma, rectal polyps were hyperplastic.     US abd 6/2017  FINDINGS: .  The visualized pancreas is unremarkable. The liver is echogenic  consistent with fatty infiltration. No focal liver lesions are  identified. The gallbladder Is unremarkable with no shadowing stones or  wall thickening.  The " common duct measures 3 mm . The right kidney  measures  11.5 cm in length and is normal in echogenicity without  hydronephrosis. The left kidney measures  11.8 cm in length and is  normal in echogenicity without hydronephrosis.  Spleen is unremarkable.   The aorta is normal in caliber. The vena cava is unremarkable.  IMPRESSION:  Fatty infiltration of the liver.      Assessment / Plan      1. History of adenomatous polyp of colon  2. Family history of colon cancer  His last colonoscopy was in 2017 by Dr. Underwood, he had tubular adenomatous polyps removed at that time.  He was due for repeat colonoscopy in 5 years, will schedule now.     He will have a colonoscopy performed with monitored anesthesia care. The indications, technique, alternatives and potential risk and complications were discussed with the patient including but not limited to bleeding, bowel perforations, missing lesions and anesthetic complications. The patient understands and wishes to proceed with the procedure and has given their verbal consent. Written patient education information was given to the patient. He should follow up in the office after this procedure to discuss the results and further recommendations can be made at that time. The patient will call if they have further questions before procedure.  - Case Request  - Miralax and dulcolax prep    3. NAFLD (nonalcoholic fatty liver disease)  4. Class 3 severe obesity with body mass index (BMI) of 40.0 to 44.9 in adult, unspecified obesity type, unspecified whether serious comorbidity present   BMI is 40.3.  He has a known history of fatty liver disease, last seen in 2017 on ultrasound of the abdomen.  Most recent lab results unavailable at this time.  We will request recent labs from PCP to review.  He does take atorvastatin for treatment of elevated cholesterol.  No history of diabetes mellitus.  Patient work on diet modification weight loss which he has been doing.  Mediterranean diet  recommended, information given.          It was recommended this visit that he be evaluated by cardiology if PCP recommends.  Possible irregular rhythm auscultated on exam today, resolved after resting.  Initial pulse rate was 114, when rechecked was 76.    Follow Up:   Return for follow up after procedure to discuss results.      Gemma Mcdaniels PA-C  [facility] [place]  5/19/2022  12:14 EDT    Please note that portions of this note may have been completed with a voice recognition program. Efforts were made to edit the dictations, but occasionally words are mistranscribed.

## 2022-05-24 ENCOUNTER — HOSPITAL ENCOUNTER (OUTPATIENT)
Facility: HOSPITAL | Age: 67
Setting detail: HOSPITAL OUTPATIENT SURGERY
End: 2022-05-24
Attending: INTERNAL MEDICINE | Admitting: INTERNAL MEDICINE

## 2022-05-24 PROBLEM — Z86.010 HISTORY OF ADENOMATOUS POLYP OF COLON: Status: ACTIVE | Noted: 2022-05-24

## 2022-05-24 PROBLEM — Z86.0101 HISTORY OF ADENOMATOUS POLYP OF COLON: Status: ACTIVE | Noted: 2022-05-24

## 2022-05-24 PROBLEM — Z80.0 FAMILY HISTORY OF COLON CANCER: Status: ACTIVE | Noted: 2022-05-24

## 2022-07-18 ENCOUNTER — HOSPITAL ENCOUNTER (OUTPATIENT)
Facility: HOSPITAL | Age: 67
Setting detail: OBSERVATION
Discharge: HOME | End: 2022-07-20
Attending: EMERGENCY MEDICINE | Admitting: FAMILY MEDICINE

## 2022-07-18 ENCOUNTER — APPOINTMENT (OUTPATIENT)
Dept: CT IMAGING | Facility: HOSPITAL | Age: 67
End: 2022-07-18

## 2022-07-18 ENCOUNTER — APPOINTMENT (OUTPATIENT)
Dept: GENERAL RADIOLOGY | Facility: HOSPITAL | Age: 67
End: 2022-07-18

## 2022-07-18 DIAGNOSIS — J90 PLEURAL EFFUSION: Primary | ICD-10-CM

## 2022-07-18 DIAGNOSIS — I50.9 ACUTE CONGESTIVE HEART FAILURE, UNSPECIFIED HEART FAILURE TYPE: ICD-10-CM

## 2022-07-18 DIAGNOSIS — J96.01 ACUTE RESPIRATORY FAILURE WITH HYPOXIA: ICD-10-CM

## 2022-07-18 PROBLEM — J81.0 ACUTE PULMONARY EDEMA (HCC): Status: ACTIVE | Noted: 2022-07-18

## 2022-07-18 LAB
A-A DO2: 99.7 MMHG
ALBUMIN SERPL-MCNC: 3.7 G/DL (ref 3.5–5.2)
ALBUMIN/GLOB SERPL: 1.2 G/DL
ALP SERPL-CCNC: 87 U/L (ref 39–117)
ALT SERPL W P-5'-P-CCNC: 24 U/L (ref 1–41)
ANION GAP SERPL CALCULATED.3IONS-SCNC: 13.3 MMOL/L (ref 5–15)
ARTERIAL PATENCY WRIST A: ABNORMAL
AST SERPL-CCNC: 17 U/L (ref 1–40)
ATMOSPHERIC PRESS: 733 MMHG
B PARAPERT DNA SPEC QL NAA+PROBE: NOT DETECTED
B PERT DNA SPEC QL NAA+PROBE: NOT DETECTED
BASE EXCESS BLDA CALC-SCNC: -3.5 MMOL/L (ref 0–2)
BASOPHILS # BLD AUTO: 0.04 10*3/MM3 (ref 0–0.2)
BASOPHILS NFR BLD AUTO: 0.3 % (ref 0–1.5)
BDY SITE: ABNORMAL
BILIRUB SERPL-MCNC: 0.7 MG/DL (ref 0–1.2)
BUN SERPL-MCNC: 21 MG/DL (ref 8–23)
BUN/CREAT SERPL: 18.9 (ref 7–25)
C PNEUM DNA NPH QL NAA+NON-PROBE: NOT DETECTED
CALCIUM SPEC-SCNC: 9.3 MG/DL (ref 8.6–10.5)
CHLORIDE SERPL-SCNC: 105 MMOL/L (ref 98–107)
CO2 SERPL-SCNC: 16.7 MMOL/L (ref 22–29)
COHGB MFR BLD: 1.1 % (ref 0–2)
CREAT SERPL-MCNC: 1.11 MG/DL (ref 0.76–1.27)
D-LACTATE SERPL-SCNC: 1.4 MMOL/L (ref 0.5–2)
DEPRECATED RDW RBC AUTO: 43.9 FL (ref 37–54)
EGFRCR SERPLBLD CKD-EPI 2021: 73.7 ML/MIN/1.73
EOSINOPHIL # BLD AUTO: 0.01 10*3/MM3 (ref 0–0.4)
EOSINOPHIL NFR BLD AUTO: 0.1 % (ref 0.3–6.2)
ERYTHROCYTE [DISTWIDTH] IN BLOOD BY AUTOMATED COUNT: 13.2 % (ref 12.3–15.4)
FLUAV RNA RESP QL NAA+PROBE: NOT DETECTED
FLUAV SUBTYP SPEC NAA+PROBE: NOT DETECTED
FLUBV RNA ISLT QL NAA+PROBE: NOT DETECTED
FLUBV RNA RESP QL NAA+PROBE: NOT DETECTED
GAS FLOW AIRWAY: 2 LPM
GLOBULIN UR ELPH-MCNC: 3 GM/DL
GLUCOSE SERPL-MCNC: 110 MG/DL (ref 65–99)
HADV DNA SPEC NAA+PROBE: NOT DETECTED
HCO3 BLDA-SCNC: 19.6 MMOL/L (ref 22–28)
HCOV 229E RNA SPEC QL NAA+PROBE: NOT DETECTED
HCOV HKU1 RNA SPEC QL NAA+PROBE: NOT DETECTED
HCOV NL63 RNA SPEC QL NAA+PROBE: NOT DETECTED
HCOV OC43 RNA SPEC QL NAA+PROBE: NOT DETECTED
HCT VFR BLD AUTO: 36.5 % (ref 37.5–51)
HCT VFR BLD CALC: 40.7 %
HGB BLD-MCNC: 12.6 G/DL (ref 13–17.7)
HMPV RNA NPH QL NAA+NON-PROBE: NOT DETECTED
HOLD SPECIMEN: NORMAL
HOLD SPECIMEN: NORMAL
HPIV1 RNA ISLT QL NAA+PROBE: NOT DETECTED
HPIV2 RNA SPEC QL NAA+PROBE: NOT DETECTED
HPIV3 RNA NPH QL NAA+PROBE: NOT DETECTED
HPIV4 P GENE NPH QL NAA+PROBE: NOT DETECTED
IMM GRANULOCYTES # BLD AUTO: 0.07 10*3/MM3 (ref 0–0.05)
IMM GRANULOCYTES NFR BLD AUTO: 0.5 % (ref 0–0.5)
INHALED O2 CONCENTRATION: 28 %
LYMPHOCYTES # BLD AUTO: 1.42 10*3/MM3 (ref 0.7–3.1)
LYMPHOCYTES NFR BLD AUTO: 9.5 % (ref 19.6–45.3)
Lab: ABNORMAL
M PNEUMO IGG SER IA-ACNC: NOT DETECTED
MAGNESIUM SERPL-MCNC: 1.7 MG/DL (ref 1.6–2.4)
MCH RBC QN AUTO: 31 PG (ref 26.6–33)
MCHC RBC AUTO-ENTMCNC: 34.5 G/DL (ref 31.5–35.7)
MCV RBC AUTO: 89.9 FL (ref 79–97)
METHGB BLD QL: 0.4 % (ref 0–1.5)
MODALITY: ABNORMAL
MONOCYTES # BLD AUTO: 1.31 10*3/MM3 (ref 0.1–0.9)
MONOCYTES NFR BLD AUTO: 8.8 % (ref 5–12)
NEUTROPHILS NFR BLD AUTO: 12.08 10*3/MM3 (ref 1.7–7)
NEUTROPHILS NFR BLD AUTO: 80.8 % (ref 42.7–76)
NOTE: ABNORMAL
NRBC BLD AUTO-RTO: 0 /100 WBC (ref 0–0.2)
NT-PROBNP SERPL-MCNC: 6091 PG/ML (ref 0–900)
OXYHGB MFR BLDV: 90.1 % (ref 94–99)
PCO2 BLDA: 29.4 MM HG (ref 35–45)
PCO2 TEMP ADJ BLD: ABNORMAL MM[HG]
PH BLDA: 7.43 PH UNITS (ref 7.35–7.45)
PH, TEMP CORRECTED: ABNORMAL
PLATELET # BLD AUTO: 278 10*3/MM3 (ref 140–450)
PMV BLD AUTO: 10 FL (ref 6–12)
PO2 BLDA: 59.4 MM HG (ref 75–100)
PO2 TEMP ADJ BLD: ABNORMAL MM[HG]
POTASSIUM SERPL-SCNC: 4.4 MMOL/L (ref 3.5–5.2)
PROCALCITONIN SERPL-MCNC: 0.07 NG/ML (ref 0–0.25)
PROT SERPL-MCNC: 6.7 G/DL (ref 6–8.5)
RBC # BLD AUTO: 4.06 10*6/MM3 (ref 4.14–5.8)
RHINOVIRUS RNA SPEC NAA+PROBE: NOT DETECTED
RSV RNA NPH QL NAA+NON-PROBE: NOT DETECTED
SAO2 % BLDCOA: 91.5 % (ref 94–100)
SARS-COV-2 RNA NPH QL NAA+NON-PROBE: NOT DETECTED
SARS-COV-2 RNA RESP QL NAA+PROBE: NOT DETECTED
SODIUM SERPL-SCNC: 135 MMOL/L (ref 136–145)
TROPONIN T SERPL-MCNC: 0.02 NG/ML (ref 0–0.03)
TSH SERPL DL<=0.05 MIU/L-ACNC: 0.01 UIU/ML (ref 0.27–4.2)
VENTILATOR MODE: ABNORMAL
WBC NRBC COR # BLD: 14.93 10*3/MM3 (ref 3.4–10.8)
WHOLE BLOOD HOLD COAG: NORMAL
WHOLE BLOOD HOLD SPECIMEN: NORMAL

## 2022-07-18 PROCEDURE — 84145 PROCALCITONIN (PCT): CPT | Performed by: PHYSICIAN ASSISTANT

## 2022-07-18 PROCEDURE — 82805 BLOOD GASES W/O2 SATURATION: CPT

## 2022-07-18 PROCEDURE — 99220 PR INITIAL OBSERVATION CARE/DAY 70 MINUTES: CPT | Performed by: FAMILY MEDICINE

## 2022-07-18 PROCEDURE — 83880 ASSAY OF NATRIURETIC PEPTIDE: CPT | Performed by: EMERGENCY MEDICINE

## 2022-07-18 PROCEDURE — G0378 HOSPITAL OBSERVATION PER HR: HCPCS

## 2022-07-18 PROCEDURE — 25010000002 FUROSEMIDE PER 20 MG: Performed by: FAMILY MEDICINE

## 2022-07-18 PROCEDURE — 71046 X-RAY EXAM CHEST 2 VIEWS: CPT

## 2022-07-18 PROCEDURE — 87636 SARSCOV2 & INF A&B AMP PRB: CPT | Performed by: PHYSICIAN ASSISTANT

## 2022-07-18 PROCEDURE — 96374 THER/PROPH/DIAG INJ IV PUSH: CPT

## 2022-07-18 PROCEDURE — 84484 ASSAY OF TROPONIN QUANT: CPT | Performed by: EMERGENCY MEDICINE

## 2022-07-18 PROCEDURE — 25010000002 FUROSEMIDE PER 20 MG: Performed by: PHYSICIAN ASSISTANT

## 2022-07-18 PROCEDURE — 93005 ELECTROCARDIOGRAM TRACING: CPT | Performed by: FAMILY MEDICINE

## 2022-07-18 PROCEDURE — 80053 COMPREHEN METABOLIC PANEL: CPT | Performed by: EMERGENCY MEDICINE

## 2022-07-18 PROCEDURE — 96372 THER/PROPH/DIAG INJ SC/IM: CPT

## 2022-07-18 PROCEDURE — 25010000002 IOPAMIDOL 61 % SOLUTION: Performed by: EMERGENCY MEDICINE

## 2022-07-18 PROCEDURE — 96376 TX/PRO/DX INJ SAME DRUG ADON: CPT

## 2022-07-18 PROCEDURE — 36600 WITHDRAWAL OF ARTERIAL BLOOD: CPT

## 2022-07-18 PROCEDURE — 0202U NFCT DS 22 TRGT SARS-COV-2: CPT | Performed by: FAMILY MEDICINE

## 2022-07-18 PROCEDURE — 85025 COMPLETE CBC W/AUTO DIFF WBC: CPT | Performed by: EMERGENCY MEDICINE

## 2022-07-18 PROCEDURE — 82375 ASSAY CARBOXYHB QUANT: CPT

## 2022-07-18 PROCEDURE — 25010000002 ENOXAPARIN PER 10 MG: Performed by: FAMILY MEDICINE

## 2022-07-18 PROCEDURE — 71275 CT ANGIOGRAPHY CHEST: CPT

## 2022-07-18 PROCEDURE — 83050 HGB METHEMOGLOBIN QUAN: CPT

## 2022-07-18 PROCEDURE — 99284 EMERGENCY DEPT VISIT MOD MDM: CPT

## 2022-07-18 PROCEDURE — 83735 ASSAY OF MAGNESIUM: CPT | Performed by: FAMILY MEDICINE

## 2022-07-18 PROCEDURE — 84443 ASSAY THYROID STIM HORMONE: CPT | Performed by: FAMILY MEDICINE

## 2022-07-18 PROCEDURE — 83605 ASSAY OF LACTIC ACID: CPT | Performed by: PHYSICIAN ASSISTANT

## 2022-07-18 RX ORDER — ONDANSETRON 2 MG/ML
4 INJECTION INTRAMUSCULAR; INTRAVENOUS EVERY 6 HOURS PRN
Status: DISCONTINUED | OUTPATIENT
Start: 2022-07-18 | End: 2022-07-20 | Stop reason: HOSPADM

## 2022-07-18 RX ORDER — ENOXAPARIN SODIUM 100 MG/ML
80 INJECTION SUBCUTANEOUS ONCE
Status: COMPLETED | OUTPATIENT
Start: 2022-07-18 | End: 2022-07-18

## 2022-07-18 RX ORDER — FUROSEMIDE 10 MG/ML
20 INJECTION INTRAMUSCULAR; INTRAVENOUS ONCE
Status: COMPLETED | OUTPATIENT
Start: 2022-07-18 | End: 2022-07-18

## 2022-07-18 RX ORDER — ASPIRIN 81 MG/1
81 TABLET ORAL DAILY
Status: DISCONTINUED | OUTPATIENT
Start: 2022-07-19 | End: 2022-07-20 | Stop reason: HOSPADM

## 2022-07-18 RX ORDER — ENOXAPARIN SODIUM 150 MG/ML
130 INJECTION SUBCUTANEOUS EVERY 12 HOURS
Status: DISCONTINUED | OUTPATIENT
Start: 2022-07-19 | End: 2022-07-19

## 2022-07-18 RX ORDER — ACETAMINOPHEN 160 MG/5ML
650 SOLUTION ORAL EVERY 4 HOURS PRN
Status: DISCONTINUED | OUTPATIENT
Start: 2022-07-18 | End: 2022-07-20 | Stop reason: HOSPADM

## 2022-07-18 RX ORDER — ATENOLOL 25 MG/1
25 TABLET ORAL DAILY
Status: DISCONTINUED | OUTPATIENT
Start: 2022-07-19 | End: 2022-07-20 | Stop reason: HOSPADM

## 2022-07-18 RX ORDER — SODIUM CHLORIDE 0.9 % (FLUSH) 0.9 %
10 SYRINGE (ML) INJECTION AS NEEDED
Status: DISCONTINUED | OUTPATIENT
Start: 2022-07-18 | End: 2022-07-20 | Stop reason: HOSPADM

## 2022-07-18 RX ORDER — FUROSEMIDE 10 MG/ML
40 INJECTION INTRAMUSCULAR; INTRAVENOUS EVERY 12 HOURS
Status: DISCONTINUED | OUTPATIENT
Start: 2022-07-18 | End: 2022-07-20 | Stop reason: HOSPADM

## 2022-07-18 RX ORDER — IPRATROPIUM BROMIDE AND ALBUTEROL SULFATE 2.5; .5 MG/3ML; MG/3ML
3 SOLUTION RESPIRATORY (INHALATION) EVERY 4 HOURS PRN
Status: DISCONTINUED | OUTPATIENT
Start: 2022-07-18 | End: 2022-07-20 | Stop reason: HOSPADM

## 2022-07-18 RX ORDER — ENOXAPARIN SODIUM 100 MG/ML
40 INJECTION SUBCUTANEOUS EVERY 12 HOURS
Status: DISCONTINUED | OUTPATIENT
Start: 2022-07-18 | End: 2022-07-18 | Stop reason: DRUGHIGH

## 2022-07-18 RX ORDER — ACETAMINOPHEN 325 MG/1
650 TABLET ORAL EVERY 4 HOURS PRN
Status: DISCONTINUED | OUTPATIENT
Start: 2022-07-18 | End: 2022-07-20 | Stop reason: HOSPADM

## 2022-07-18 RX ORDER — FOLIC ACID 1 MG/1
1 TABLET ORAL DAILY
Status: DISCONTINUED | OUTPATIENT
Start: 2022-07-18 | End: 2022-07-20

## 2022-07-18 RX ORDER — MULTIPLE VITAMINS W/ MINERALS TAB 9MG-400MCG
1 TAB ORAL DAILY
Status: DISCONTINUED | OUTPATIENT
Start: 2022-07-18 | End: 2022-07-20

## 2022-07-18 RX ORDER — LISINOPRIL 20 MG/1
20 TABLET ORAL DAILY
Status: DISCONTINUED | OUTPATIENT
Start: 2022-07-19 | End: 2022-07-20 | Stop reason: HOSPADM

## 2022-07-18 RX ORDER — LEVOTHYROXINE SODIUM 0.05 MG/1
50 TABLET ORAL DAILY
Status: DISCONTINUED | OUTPATIENT
Start: 2022-07-19 | End: 2022-07-20 | Stop reason: HOSPADM

## 2022-07-18 RX ORDER — ACETAMINOPHEN 650 MG/1
650 SUPPOSITORY RECTAL EVERY 4 HOURS PRN
Status: DISCONTINUED | OUTPATIENT
Start: 2022-07-18 | End: 2022-07-20 | Stop reason: HOSPADM

## 2022-07-18 RX ORDER — ATORVASTATIN CALCIUM 40 MG/1
40 TABLET, FILM COATED ORAL DAILY
Status: DISCONTINUED | OUTPATIENT
Start: 2022-07-19 | End: 2022-07-20 | Stop reason: HOSPADM

## 2022-07-18 RX ORDER — SODIUM CHLORIDE 0.9 % (FLUSH) 0.9 %
10 SYRINGE (ML) INJECTION EVERY 12 HOURS SCHEDULED
Status: DISCONTINUED | OUTPATIENT
Start: 2022-07-18 | End: 2022-07-20 | Stop reason: HOSPADM

## 2022-07-18 RX ADMIN — MULTIPLE VITAMINS W/ MINERALS TAB 1 TABLET: TAB at 17:12

## 2022-07-18 RX ADMIN — ENOXAPARIN SODIUM 40 MG: 40 INJECTION SUBCUTANEOUS at 17:12

## 2022-07-18 RX ADMIN — IOPAMIDOL 100 ML: 612 INJECTION, SOLUTION INTRAVENOUS at 13:32

## 2022-07-18 RX ADMIN — FUROSEMIDE 40 MG: 10 INJECTION, SOLUTION INTRAMUSCULAR; INTRAVENOUS at 17:12

## 2022-07-18 RX ADMIN — THIAMINE HCL TAB 100 MG 100 MG: 100 TAB at 17:12

## 2022-07-18 RX ADMIN — FOLIC ACID 1 MG: 1 TABLET ORAL at 17:12

## 2022-07-18 RX ADMIN — ACETAMINOPHEN 650 MG: 325 TABLET ORAL at 20:19

## 2022-07-18 RX ADMIN — ENOXAPARIN SODIUM 80 MG: 100 INJECTION SUBCUTANEOUS at 20:19

## 2022-07-18 RX ADMIN — Medication 10 ML: at 20:19

## 2022-07-18 RX ADMIN — FUROSEMIDE 20 MG: 10 INJECTION, SOLUTION INTRAMUSCULAR; INTRAVENOUS at 14:31

## 2022-07-18 NOTE — ED PROVIDER NOTES
Subjective   66-year-old male presents with shortness of breath and dizziness.  He states he had a sudden onset of shortness of breath while sitting in his recliner yesterday when he tried to get up.  It was sudden onset and he feels so short of breath that he has difficulty walking across the room.  This is new, he was not exerting himself in any way, he is not any recent travel, he does not have a history of any cardiac or lung disease.  Upon arrival the patient's oxygen saturation was below 90% and he was put on 2 L of O2.  He also states that he gets some dizziness especially when he changes position.      History provided by:  Patient   used: No        Review of Systems   Constitutional: Positive for diaphoresis and fatigue.   Respiratory: Positive for shortness of breath.    Cardiovascular: Negative for chest pain and leg swelling.   Neurological: Positive for weakness.   All other systems reviewed and are negative.      Past Medical History:   Diagnosis Date   • Basal cell carcinoma    • Colon polyp 2012   • DJD (degenerative joint disease), ankle and foot, left    • Elevated LFTs    • Gout    • Heart murmur    • Hyperlipidemia    • Hypertension    • JUAN (obstructive sleep apnea)    • Sleep apnea     REPORTS HE TURNED CPAP IN, WAS UNABLE TO WEAR IT   • Snores        No Known Allergies    Past Surgical History:   Procedure Laterality Date   • COLONOSCOPY  2012   • COLONOSCOPY N/A 6/20/2017    Procedure: COLONOSCOPY WITH HOT BIOPSY POLYPECTOMIES;  Surgeon: Rohith Underwood MD;  Location: [facility] ENDOSCOPY;  Service:    • FOOT SURGERY Left        Family History   Problem Relation Age of Onset   • Liver disease Mother    • Alcohol abuse Mother    • Cirrhosis Mother    • Colon cancer Paternal Uncle        Social History     Socioeconomic History   • Marital status:    Tobacco Use   • Smoking status: Former Smoker     Packs/day: 3.00     Years: 30.00     Pack years: 90.00     Types:  Cigarettes     Quit date:      Years since quittin.5   • Smokeless tobacco: Never Used   Vaping Use   • Vaping Use: Never used   Substance and Sexual Activity   • Alcohol use: Yes     Alcohol/week: 6.0 standard drinks     Types: 6 Cans of beer per week     Comment: 6-7 beers per day   • Drug use: No   • Sexual activity: Defer           Objective   Physical Exam  Vitals and nursing note reviewed.   Constitutional:       Appearance: He is well-developed. He is ill-appearing.   HENT:      Head: Normocephalic and atraumatic.   Cardiovascular:      Rate and Rhythm: Normal rate and regular rhythm.   Pulmonary:      Effort: Pulmonary effort is normal.      Breath sounds: Rhonchi present.   Abdominal:      General: Bowel sounds are normal.      Palpations: Abdomen is soft.   Musculoskeletal:         General: Normal range of motion.      Cervical back: Normal range of motion.      Right lower leg: Edema present.      Left lower leg: Edema present.   Skin:     General: Skin is warm and dry.   Neurological:      Mental Status: He is alert and oriented to person, place, and time.   Psychiatric:         Behavior: Behavior normal.         Thought Content: Thought content normal.         Judgment: Judgment normal.         Procedures           ED Course  ED Course as of 22 1431   Mon 2022   1051 EKG interpreted by me: Poor tracing overall, either sinus rhythm with ectopy versus atrial fibrillation, there are no obvious acute ST/T changes, this is an abnormal EKG [MP]   1123 XR Chest 2 View [CS]      ED Course User Index  [CS] Pepe Rosas Jr., PA-C  [MP] Suraj Moulton MD                                           MDM  Number of Diagnoses or Management Options  Acute congestive heart failure, unspecified heart failure type (HCC): new and requires workup  Pleural effusion: new and requires workup     Amount and/or Complexity of Data Reviewed  Clinical lab tests: reviewed  Tests in the radiology  section of CPT®: reviewed  Discuss the patient with other providers: yes    Risk of Complications, Morbidity, and/or Mortality  Presenting problems: moderate  Diagnostic procedures: low  Management options: moderate    Patient Progress  Patient progress: stable      Final diagnoses:   Pleural effusion   Acute congestive heart failure, unspecified heart failure type (HCC)       ED Disposition  ED Disposition     ED Disposition   Decision to Admit    Condition   --    Comment   Level of Care: Med/Surg [1]   Diagnosis: Pleural effusion [693913]   Admitting Physician: YAYA GRIFFIN [[id number]]   Attending Physician: YAYA GRIFFIN [[id number]]               No follow-up provider specified.       Medication List      No changes were made to your prescriptions during this visit.          Pepe Rosas Jr., PA-C  07/18/22 [id number]

## 2022-07-18 NOTE — H&P
[facility] HOSPITALIST   HISTORY AND PHYSICAL      Name:  Damion Todd Jr.   Age:  66 y.o.  Sex:  male  :  1955  MRN:  [id number]   Visit Number:  [id number]  Admission Date:  2022  Date Of Service:  22  Primary Care Physician:  Yasir Dougherty MD    Chief Complaint:     Shortness of breath    History Of Presenting Illness:      Patient is a 66 years old male with a past medical history of hypertension, hyperlipidemia, sleep apnea and heart murmur who presented to the ER with a chief complaint of shortness of breath.  Patient reports worsening shortness of breath and dyspnea on exertion over the past few days however progressively got worse yesterday especially on movement.  Patient never experienced similar symptoms in the past and this is all new for him.  Patient denies any history of COPD or CHF that he is aware of.  Patient is not on oxygen at baseline.  Patient used to smoke in the past however quit around 25 years ago.  He currently drinks beers on a daily basis, used to drink 14 beers a day, however trying to cut down, he had 2 beers yesterday.     Upon ER evaluation, Patient was hypertensive with a blood pressure of 178/56, tachypneic with respirations of 24, temperature of 100.3, heart rate of 61.  Patient was requiring 2 to 3 L of oxygen through nasal cannula to keep his saturation above 90%.  Work-up significant for proBNP of 6091, troponin 0.016, glucose 110, sodium 135, WBC 14.9, hemoglobin 12.6.  Pro-Олег and lactate WNL.  Flu and COVID-negative.  CTA chest showed no evidence of pulmonary embolism. Small bilateral pleural effusions.  Patient received Lasix 20 mg in the ER.  Hospitalist consulted for admission, further evaluation and treatment    Review Of Systems:    All systems were reviewed and negative except as mentioned in history of presenting illness, assessment and plan.    Past Medical History: Patient  has a past medical history of Basal cell  carcinoma, Colon polyp (2012), DJD (degenerative joint disease), ankle and foot, left, Elevated LFTs, Gout, Heart murmur, Hyperlipidemia, Hypertension, JUAN (obstructive sleep apnea), Sleep apnea, and Snores.    Past Surgical History: Patient  has a past surgical history that includes Foot surgery (Left); Colonoscopy (2012); and Colonoscopy (N/A, 6/20/2017).    Social History: Patient  reports that he quit smoking about 11 years ago. His smoking use included cigarettes. He has a 90.00 pack-year smoking history. He has never used smokeless tobacco. He reports current alcohol use of about 6.0 standard drinks of alcohol per week. He reports that he does not use drugs.    Family History: Patient's family history includes Alcohol abuse in his mother; Cirrhosis in his mother; Colon cancer in his paternal uncle; Liver disease in his mother.    Allergies:      Patient has no known allergies.    Home Medications:    Prior to Admission Medications     Prescriptions Last Dose Informant Patient Reported? Taking?    allopurinol (ZYLOPRIM) 300 MG tablet  Self Yes No    Take 300 mg by mouth Daily.    ASPIRIN LOW DOSE 81 MG EC tablet   Yes No    atenolol (TENORMIN) 25 MG tablet  Self Yes No    Take 25 mg by mouth Daily.    atorvastatin (LIPITOR) 40 MG tablet  Self Yes No    Take 40 mg by mouth Daily.    bisacodyl (Dulcolax) 5 MG EC tablet   No No    Follow instructions given at office    hydrOXYzine (ATARAX) 25 MG tablet   Yes No    levothyroxine (SYNTHROID, LEVOTHROID) 50 MCG tablet   Yes No    Take 50 mcg by mouth Daily.    lisinopril (PRINIVIL,ZESTRIL) 20 MG tablet  Self Yes No    Take 20 mg by mouth Daily.    polyethylene glycol (MIRALAX) 17 GM/SCOOP powder   No No    Follow directions given at office    triamcinolone (KENALOG) 0.1 % cream   No No    Apply  topically 3 (Three) Times a Day. Apply a very thin layer of cream over affected area 3 times a day        ED Medications:    Medications   sodium chloride 0.9 % flush 10 mL  "(has no administration in time range)   furosemide (LASIX) injection 20 mg (has no administration in time range)   iopamidol (ISOVUE-300) 61 % injection 100 mL (100 mL Intravenous Given 7/18/22 1332)     Vital Signs:  Temp:  [100.3 °F (37.9 °C)] 100.3 °F (37.9 °C)  Heart Rate:  [61] 61  Resp:  [24] 24  BP: (178)/(56) 178/56        07/18/22  1027   Weight: 132 kg (290 lb)     Body mass index is 39.33 kg/m².    Physical Exam:     Most recent vital Signs: /56 (BP Location: Right arm, Patient Position: Sitting)   Pulse 61   Temp 100.3 °F (37.9 °C) (Oral)   Resp 24   Ht 182.9 cm (72\")   Wt 132 kg (290 lb)   SpO2 93%   BMI 39.33 kg/m²     Physical Exam  Vitals and nursing note reviewed.   Constitutional:       General: He is not in acute distress.     Appearance: He is obese.   HENT:      Head: Normocephalic and atraumatic.      Right Ear: External ear normal.      Left Ear: External ear normal.      Nose: Nose normal.      Mouth/Throat:      Mouth: Mucous membranes are moist.   Eyes:      Extraocular Movements: Extraocular movements intact.      Conjunctiva/sclera: Conjunctivae normal.      Pupils: Pupils are equal, round, and reactive to light.   Cardiovascular:      Rate and Rhythm: Normal rate and regular rhythm.      Pulses: Normal pulses.      Heart sounds: Normal heart sounds.   Pulmonary:      Effort: Tachypnea and accessory muscle usage present.      Breath sounds: Decreased air movement present. Rhonchi present. No wheezing.      Comments: Bilateral rhonchi heard.  Abdominal:      General: Bowel sounds are normal. There is no distension.      Palpations: Abdomen is soft.      Tenderness: There is no abdominal tenderness.   Musculoskeletal:         General: Normal range of motion.      Cervical back: Normal range of motion and neck supple.      Right lower leg: No edema.      Left lower leg: No edema.   Skin:     General: Skin is warm and dry.      Findings: No rash.   Neurological:      General: No " focal deficit present.      Mental Status: He is alert and oriented to person, place, and time. Mental status is at baseline.      Motor: No weakness.   Psychiatric:         Mood and Affect: Mood normal.         Behavior: Behavior normal.         Laboratory data:    I have reviewed the labs done in the emergency room.    Results from last 7 days   Lab Units 07/18/22  1210   SODIUM mmol/L 135*   POTASSIUM mmol/L 4.4   CHLORIDE mmol/L 105   CO2 mmol/L 16.7*   BUN mg/dL 21   CREATININE mg/dL 1.11   CALCIUM mg/dL 9.3   BILIRUBIN mg/dL 0.7   ALK PHOS U/L 87   ALT (SGPT) U/L 24   AST (SGOT) U/L 17   GLUCOSE mg/dL 110*     Results from last 7 days   Lab Units 07/18/22  1210   WBC 10*3/mm3 14.93*   HEMOGLOBIN g/dL 12.6*   HEMATOCRIT % 36.5*   PLATELETS 10*3/mm3 278         Results from last 7 days   Lab Units 07/18/22  1210   TROPONIN T ng/mL 0.016     Results from last 7 days   Lab Units 07/18/22  1210   PROBNP pg/mL 6,091.0*             Results from last 7 days   Lab Units 07/18/22  1127   PH, ARTERIAL pH units 7.432   PO2 ART mm Hg 59.4*   PCO2, ARTERIAL mm Hg 29.4*   HCO3 ART mmol/L 19.6*           Invalid input(s): USDES,  BLOODU, NITRITITE, BACT, EP    Pain Management Panel    There is no flowsheet data to display.         EKG:      Poor quality EKG with low voltage and poor tracing, difficult to assess rhythm, heart rate of 92, no acute ST/T wave changes.  Repeat EKG ordered.    Radiology:    XR Chest 2 View    Result Date: 7/18/2022  PROCEDURE: XR CHEST 2 VW-   HISTORY: SOA Triage Protocol  COMPARISON: 12/03/2015.  FINDINGS:  Diffuse interstitial prominence is seen new from prior exam probably due to mild edema. No focal infiltrate is identified.  There is no evidence of effusion or other pleural disease.  The mediastinum has a normal appearance.  The cardiac silhouette is unremarkable.      Mild pulmonary edema suspected  This report was signed and finalized on 7/18/2022 11:18 AM by Ovidio Robbins MD.    CT  Angiogram Chest Pulmonary Embolism    Result Date: 7/18/2022  PROCEDURE: CT ANGIOGRAM CHEST PULMONARY EMBOLISM-  HISTORY: soa  TECHNIQUE: Thin section axial CT with IV contrast supplemented with 3D reconstructed MIP images.  FINDINGS:  Pulmonary vessels enhance in a normal fashion without evidence of embolic disease. Thoracic aorta is normal in caliber without evidence of aneurysm or dissection.  No acute lung disease is present.  Small bilateral pleural effusions are present. There is no pericardial effusion.. A few borderline enlarged right paratracheal lymph nodes are noted..      1. No evidence of pulmonary embolism. 2. Small bilateral pleural effusions    This study was performed with techniques to keep radiation doses as low as reasonably achievable (ALARA). Individualized dose reduction techniques using automated exposure control or adjustment of vA and/or kV according to the patient size were employed.  This report was signed and finalized on 7/18/2022 1:51 PM by Ovidio Robbins MD.      Assessment:    1. Acute hypoxic respiratory failure, secondary #2, POA  2. Acute pulmonary edema, POA  3. Bilateral pleural effusions, POA  4. New diagnosis of heart failure, undetermined systolic/diastolic, POA  5. Hypertension  6. Hyperlipidemia  7. Sleep apnea  8. Chronic alcoholic    Plan:    Patient is admitted to telemetry for further evaluation treatment.    Acute hypoxic respiratory failure  Acute pulmonary edema  New diagnosis of heart failure  Continue oxygen supplements to keep saturation above 90%.  Patient is currently on 2 to 3 L through nasal cannula.  Continue to titrate down as able to.    proBNP elevated.  Daily weight, strict ins and outs, fluid restriction to 1.5 L.  Low-sodium diet.  Lasix 40 mg IV twice daily.  2D echo ordered.  Will consult with cardiology and appreciate recommendations.    Chronic alcoholic  CIWA per protocol.     Hypertension  Hyperlipidemia  Continue home meds as  indicated.    -Respiratory panel, TSH and magnesium pending.  -Poor quality EKG, repeat EKG ordered.  -Further orders as indicated per clinical course.    Addendum:   Repeated EKG is consistent with A. fib with slow ventricular response, heart rate of 55, nonspecific ST/T wave changes.  Patient with no previous history of A. fib. [id number] score of at least 2.  We will keep patient on therapeutic Lovenox and will follow up with cardiology on recommendations.    Risk Assessment: High  DVT Prophylaxis: Lovenox prophylaxis (benefit> risk)  Code Status: Full  Diet: Cardiac, low-sodium    Advance Care Planning   ACP discussion was held with the patient during this visit. Patient does not have an advance directive, information provided.           Mark Knutson MD  07/18/22  14:22 EDT    Dictated utilizing Dragon dictation.

## 2022-07-18 NOTE — PLAN OF CARE
Goal Outcome Evaluation:  Plan of Care Reviewed With: patient    Interventions implemented as appropriate.

## 2022-07-19 ENCOUNTER — APPOINTMENT (OUTPATIENT)
Dept: CARDIOLOGY | Facility: HOSPITAL | Age: 67
End: 2022-07-19

## 2022-07-19 LAB
ANION GAP SERPL CALCULATED.3IONS-SCNC: 15 MMOL/L (ref 5–15)
BASOPHILS # BLD MANUAL: 0.12 10*3/MM3 (ref 0–0.2)
BASOPHILS NFR BLD MANUAL: 1 % (ref 0–1.5)
BH CV ECHO MEAS - AO MAX PG: 37.7 MMHG
BH CV ECHO MEAS - AO MEAN PG: 18 MMHG
BH CV ECHO MEAS - AO V2 MAX: 307 CM/SEC
BH CV ECHO MEAS - AO V2 VTI: 50.1 CM
BH CV ECHO MEAS - EDV(CUBED): 119.8 ML
BH CV ECHO MEAS - EDV(MOD-SP2): 114 ML
BH CV ECHO MEAS - EDV(MOD-SP4): 94.3 ML
BH CV ECHO MEAS - EF(MOD-BP): 73.6 %
BH CV ECHO MEAS - EF(MOD-SP2): 71 %
BH CV ECHO MEAS - EF(MOD-SP4): 74.4 %
BH CV ECHO MEAS - ESV(CUBED): 31.3 ML
BH CV ECHO MEAS - ESV(MOD-SP2): 33.1 ML
BH CV ECHO MEAS - ESV(MOD-SP4): 24.1 ML
BH CV ECHO MEAS - FS: 36.1 %
BH CV ECHO MEAS - IVS/LVPW: 0.79 CM
BH CV ECHO MEAS - LV MASS(C)D: 270.5 GRAMS
BH CV ECHO MEAS - LV MAX PG: 12.3 MMHG
BH CV ECHO MEAS - LV MEAN PG: 8 MMHG
BH CV ECHO MEAS - LV V1 MAX: 175 CM/SEC
BH CV ECHO MEAS - LV V1 VTI: 31.9 CM
BH CV ECHO MEAS - LVIDD: 4.9 CM
BH CV ECHO MEAS - LVIDS: 3.2 CM
BH CV ECHO MEAS - MV DEC SLOPE: 639.5 CM/SEC2
BH CV ECHO MEAS - MV DEC TIME: 0.22 MSEC
BH CV ECHO MEAS - MV E MAX VEL: 151 CM/SEC
BH CV ECHO MEAS - MV MAX PG: 9.1 MMHG
BH CV ECHO MEAS - MV MEAN PG: 3 MMHG
BH CV ECHO MEAS - MV P1/2T: 72.8 MSEC
BH CV ECHO MEAS - MV V2 VTI: 34.6 CM
BH CV ECHO MEAS - MVA(P1/2T): 3 CM2
BH CV ECHO MEAS - PA ACC TIME: 0.07 SEC
BH CV ECHO MEAS - PA PR(ACCEL): 45.7 MMHG
BH CV ECHO MEAS - PA V2 MAX: 202 CM/SEC
BH CV ECHO MEAS - RAP SYSTOLE: 3 MMHG
BH CV ECHO MEAS - RV MAX PG: 3.3 MMHG
BH CV ECHO MEAS - RV V1 MAX: 90.9 CM/SEC
BH CV ECHO MEAS - RV V1 VTI: 17.6 CM
BH CV ECHO MEAS - SV(MOD-SP2): 80.9 ML
BH CV ECHO MEAS - SV(MOD-SP4): 70.2 ML
BH CV ECHO MEAS - TAPSE (>1.6): 2.9 CM
BH CV XLRA - TDI S': 15 CM/SEC
BUN SERPL-MCNC: 22 MG/DL (ref 8–23)
BUN/CREAT SERPL: 22.9 (ref 7–25)
CALCIUM SPEC-SCNC: 8.9 MG/DL (ref 8.6–10.5)
CHLORIDE SERPL-SCNC: 103 MMOL/L (ref 98–107)
CO2 SERPL-SCNC: 19 MMOL/L (ref 22–29)
CREAT SERPL-MCNC: 0.96 MG/DL (ref 0.76–1.27)
DEPRECATED RDW RBC AUTO: 43.6 FL (ref 37–54)
EGFRCR SERPLBLD CKD-EPI 2021: 87.7 ML/MIN/1.73
ERYTHROCYTE [DISTWIDTH] IN BLOOD BY AUTOMATED COUNT: 13.3 % (ref 12.3–15.4)
GLUCOSE SERPL-MCNC: 102 MG/DL (ref 65–99)
HCT VFR BLD AUTO: 35.1 % (ref 37.5–51)
HGB BLD-MCNC: 12 G/DL (ref 13–17.7)
LEFT ATRIUM VOLUME INDEX: 22.7 ML/M2
LV EF 2D ECHO EST: 77 %
LYMPHOCYTES # BLD MANUAL: 1.43 10*3/MM3 (ref 0.7–3.1)
LYMPHOCYTES NFR BLD MANUAL: 5 % (ref 5–12)
MAXIMAL PREDICTED HEART RATE: 155 BPM
MCH RBC QN AUTO: 30.9 PG (ref 26.6–33)
MCHC RBC AUTO-ENTMCNC: 34.2 G/DL (ref 31.5–35.7)
MCV RBC AUTO: 90.5 FL (ref 79–97)
MONOCYTES # BLD: 0.6 10*3/MM3 (ref 0.1–0.9)
NEUTROPHILS # BLD AUTO: 9.77 10*3/MM3 (ref 1.7–7)
NEUTROPHILS NFR BLD MANUAL: 82 % (ref 42.7–76)
PLAT MORPH BLD: NORMAL
PLATELET # BLD AUTO: 263 10*3/MM3 (ref 140–450)
PMV BLD AUTO: 10.8 FL (ref 6–12)
POTASSIUM SERPL-SCNC: 3.8 MMOL/L (ref 3.5–5.2)
RBC # BLD AUTO: 3.88 10*6/MM3 (ref 4.14–5.8)
RBC MORPH BLD: NORMAL
SODIUM SERPL-SCNC: 137 MMOL/L (ref 136–145)
STRESS TARGET HR: 132 BPM
T4 FREE SERPL-MCNC: 1.29 NG/DL (ref 0.93–1.7)
VARIANT LYMPHS NFR BLD MANUAL: 12 % (ref 19.6–45.3)
WBC MORPH BLD: NORMAL
WBC NRBC COR # BLD: 11.92 10*3/MM3 (ref 3.4–10.8)

## 2022-07-19 PROCEDURE — 25010000002 SULFUR HEXAFLUORIDE MICROSPH 60.7-25 MG RECONSTITUTED SUSPENSION: Performed by: FAMILY MEDICINE

## 2022-07-19 PROCEDURE — G0378 HOSPITAL OBSERVATION PER HR: HCPCS

## 2022-07-19 PROCEDURE — 93306 TTE W/DOPPLER COMPLETE: CPT | Performed by: INTERNAL MEDICINE

## 2022-07-19 PROCEDURE — 25010000002 FUROSEMIDE PER 20 MG: Performed by: FAMILY MEDICINE

## 2022-07-19 PROCEDURE — 97165 OT EVAL LOW COMPLEX 30 MIN: CPT

## 2022-07-19 PROCEDURE — 99204 OFFICE O/P NEW MOD 45 MIN: CPT | Performed by: INTERNAL MEDICINE

## 2022-07-19 PROCEDURE — 84439 ASSAY OF FREE THYROXINE: CPT | Performed by: FAMILY MEDICINE

## 2022-07-19 PROCEDURE — 99225 PR SBSQ OBSERVATION CARE/DAY 25 MINUTES: CPT | Performed by: FAMILY MEDICINE

## 2022-07-19 PROCEDURE — 96376 TX/PRO/DX INJ SAME DRUG ADON: CPT

## 2022-07-19 PROCEDURE — 93306 TTE W/DOPPLER COMPLETE: CPT

## 2022-07-19 PROCEDURE — 85027 COMPLETE CBC AUTOMATED: CPT | Performed by: FAMILY MEDICINE

## 2022-07-19 PROCEDURE — 97161 PT EVAL LOW COMPLEX 20 MIN: CPT

## 2022-07-19 PROCEDURE — 85007 BL SMEAR W/DIFF WBC COUNT: CPT | Performed by: FAMILY MEDICINE

## 2022-07-19 PROCEDURE — 80048 BASIC METABOLIC PNL TOTAL CA: CPT | Performed by: FAMILY MEDICINE

## 2022-07-19 RX ORDER — ALLOPURINOL 100 MG/1
300 TABLET ORAL DAILY
Status: DISCONTINUED | OUTPATIENT
Start: 2022-07-19 | End: 2022-07-20 | Stop reason: HOSPADM

## 2022-07-19 RX ADMIN — LISINOPRIL 20 MG: 20 TABLET ORAL at 08:57

## 2022-07-19 RX ADMIN — MULTIPLE VITAMINS W/ MINERALS TAB 1 TABLET: TAB at 08:57

## 2022-07-19 RX ADMIN — APIXABAN 5 MG: 5 TABLET, FILM COATED ORAL at 21:41

## 2022-07-19 RX ADMIN — APIXABAN 5 MG: 5 TABLET, FILM COATED ORAL at 13:02

## 2022-07-19 RX ADMIN — FUROSEMIDE 40 MG: 10 INJECTION, SOLUTION INTRAMUSCULAR; INTRAVENOUS at 18:44

## 2022-07-19 RX ADMIN — ATORVASTATIN CALCIUM 40 MG: 40 TABLET, FILM COATED ORAL at 08:56

## 2022-07-19 RX ADMIN — FOLIC ACID 1 MG: 1 TABLET ORAL at 08:56

## 2022-07-19 RX ADMIN — ALLOPURINOL 300 MG: 100 TABLET ORAL at 13:02

## 2022-07-19 RX ADMIN — LEVOTHYROXINE SODIUM 50 MCG: 50 TABLET ORAL at 08:57

## 2022-07-19 RX ADMIN — Medication 10 ML: at 21:42

## 2022-07-19 RX ADMIN — Medication 10 ML: at 09:00

## 2022-07-19 RX ADMIN — SULFUR HEXAFLUORIDE 6 ML: KIT at 08:10

## 2022-07-19 RX ADMIN — ATENOLOL 25 MG: 25 TABLET ORAL at 08:57

## 2022-07-19 RX ADMIN — ASPIRIN 81 MG: 81 TABLET, COATED ORAL at 08:57

## 2022-07-19 RX ADMIN — FUROSEMIDE 40 MG: 10 INJECTION, SOLUTION INTRAMUSCULAR; INTRAVENOUS at 06:02

## 2022-07-19 RX ADMIN — THIAMINE HCL TAB 100 MG 100 MG: 100 TAB at 08:57

## 2022-07-19 NOTE — THERAPY DISCHARGE NOTE
Patient Name: Damion Todd Jr.  : 1955    MRN: [id number]                              Today's Date: 2022       Admit Date: 2022    Visit Dx:     ICD-10-CM ICD-9-CM   1. Pleural effusion  J90 511.9   2. Acute congestive heart failure, unspecified heart failure type (HCC)  I50.9 428.0     Patient Active Problem List   Diagnosis   • Colon cancer screening   • Bright red blood per rectum   • Elevated liver function tests   • History of adenomatous polyp of colon   • Family history of colon cancer   • Pleural effusion   • New onset of congestive heart failure (HCC)   • Acute pulmonary edema (HCC)   • Acute respiratory failure with hypoxia (HCC)     Past Medical History:   Diagnosis Date   • Basal cell carcinoma    • Colon polyp    • DJD (degenerative joint disease), ankle and foot, left    • Elevated LFTs    • Gout    • Heart murmur    • Hyperlipidemia    • Hypertension    • JUAN (obstructive sleep apnea)    • Sleep apnea     REPORTS HE TURNED CPAP IN, WAS UNABLE TO WEAR IT   • Snores      Past Surgical History:   Procedure Laterality Date   • COLONOSCOPY     • COLONOSCOPY N/A 2017    Procedure: COLONOSCOPY WITH HOT BIOPSY POLYPECTOMIES;  Surgeon: Rohith Underwood MD;  Location: [facility] ENDOSCOPY;  Service:    • FOOT SURGERY Left       General Information     Row Name 22 1200          OT Time and Intention    Document Type discharge evaluation/summary  -SD     Mode of Treatment occupational therapy  -SD     Row Name 22 1200          General Information    Patient Profile Reviewed yes  -SD     Prior Level of Function independent:;all household mobility;community mobility  -SD     Existing Precautions/Restrictions oxygen therapy device and L/min  -SD     Barriers to Rehab none identified  -SD     Row Name 22 1200          Living Environment    People in Home alone  -SD     Row Name 22 1200          Home Main Entrance    Number of Stairs, Main Entrance three  -SD      Stair Railings, Main Entrance railings not in good condition, need repair for safe use  -SD     Row Name 07/19/22 1200          Stairs Within Home, Primary    Number of Stairs, Within Home, Primary none  -SD     Row Name 07/19/22 1200          Cognition    Orientation Status (Cognition) oriented x 4  -SD     Row Name 07/19/22 1200          Safety Issues, Functional Mobility    Safety Issues Affecting Function (Mobility) safety precautions follow-through/compliance  -SD     Impairments Affecting Function (Mobility) shortness of breath;endurance/activity tolerance  -SD           User Key  (r) = Recorded By, (t) = Taken By, (c) = Cosigned By    Initials Name Provider Type    SD Haylie Miranda OT Occupational Therapist                 Mobility/ADL's     Row Name 07/19/22 1201          Bed Mobility    Bed Mobility bed mobility (all) activities  -SD     All Activities, [place] (Bed Mobility) modified independence  -SD     Row Name 07/19/22 1201          Transfers    Transfers sit-stand transfer  -SD     Sit-Stand [place] (Transfers) standby assist  -SD     Row Name 07/19/22 1201          Functional Mobility    Functional Mobility- Ind. Level standby assist  -SD     Functional Mobility-Distance (Feet) 380  -SD     Functional Mobility- Safety Issues supplemental O2  -SD     Row Name 07/19/22 1201          Activities of Daily Living    BADL Assessment/Intervention bathing;upper body dressing;lower body dressing;grooming;feeding;toileting  -SD     Row Name 07/19/22 1201          Bathing Assessment/Intervention    [place] Level (Bathing) supervision  -SD     Row Name 07/19/22 1201          Upper Body Dressing Assessment/Training    [place] Level (Upper Body Dressing) set up  -SD     Row Name 07/19/22 1201          Lower Body Dressing Assessment/Training    [place] Level (Lower Body Dressing) set up  -SD     Comment, (Lower Body Dressing) doesn't wear socks  -SD     Row Name 07/19/22 1201           Grooming Assessment/Training    [place] Level (Grooming) set up  -SD     [facility] Name 07/19/22 1201          Self-Feeding Assessment/Training    [place] Level (Feeding) set up  -SD     [facility] Name 07/19/22 1201          Toileting Assessment/Training    [place] Level (Toileting) supervision  -SD           User Key  (r) = Recorded By, (t) = Taken By, (c) = Cosigned By    Initials Name Provider Type    Haylie Osei OT Occupational Therapist               Obj/Interventions     Row Name 07/19/22 1203          Range of Motion Comprehensive    General Range of Motion bilateral upper extremity ROM WFL  -SD     Row Name 07/19/22 1203          Strength Comprehensive (MMT)    General Manual Muscle Testing (MMT) Assessment no strength deficits identified  -SD           User Key  (r) = Recorded By, (t) = Taken By, (c) = Cosigned By    Initials Name Provider Type    Haylie Osei OT Occupational Therapist               Goals/Plan    No documentation.                Clinical Impression     Row Name 07/19/22 1203          Pain Assessment    Pretreatment Pain Rating 0/10 - no pain  -SD     Posttreatment Pain Rating 0/10 - no pain  -SD     Row Name 07/19/22 [facility]3          Plan of Care Review    Plan of Care Reviewed With patient  -SD     Progress no change  -SD     Outcome Evaluation OT eval completed. Patient completed bed mobility with mod ind, tf and walked 380' with SBA. Patient able to complete ADLs with S/U-Sup. O2 on 3L ranged 94, 93, 94 before, during, after activity. No further OT needs at this time. Patient to walk with nursing as tolerated.  -SD     Row Name 07/19/22 1203          Therapy Assessment/Plan (OT)    Patient/Family Therapy Goal Statement (OT) home  -SD     Rehab Potential (OT) good, to achieve stated therapy goals  -SD     Criteria for Skilled Therapeutic Interventions Met (OT) no problems identified which require skilled intervention  -SD     Therapy Frequency (OT) evaluation only  -SD      Row Name 07/19/22 1203          Therapy Plan Review/Discharge Plan (OT)    Anticipated Discharge Disposition (OT) home  -SD     Row Name 07/19/22 1203          Vital Signs    Pre SpO2 (%) 94  -SD     O2 Delivery Pre Treatment supplemental O2  -SD     Intra SpO2 (%) 93  -SD     O2 Delivery Intra Treatment supplemental O2  -SD     Post SpO2 (%) 94  -SD     O2 Delivery Post Treatment supplemental O2  -SD     Row Name 07/19/22 1203          Positioning and Restraints    Pre-Treatment Position in bed  -SD     Post Treatment Position chair  -SD     In Chair sitting;call light within reach;encouraged to call for assist;notified nsg  -SD           User Key  (r) = Recorded By, (t) = Taken By, (c) = Cosigned By    Initials Name Provider Type    Haylie Osie OT Occupational Therapist               Outcome Measures     Row Name 07/19/22 1206          How much help from another is currently needed...    Putting on and taking off regular lower body clothing? 4  -SD     Bathing (including washing, rinsing, and drying) 3  -SD     Toileting (which includes using toilet bed pan or urinal) 4  -SD     Putting on and taking off regular upper body clothing 4  -SD     Taking care of personal grooming (such as brushing teeth) 4  -SD     Eating meals 4  -SD     AM-PAC 6 Clicks Score (OT) 23  -SD     Row Name 07/19/22 1206          Functional Assessment    Outcome Measure Options AM-PAC 6 Clicks Daily Activity (OT)  -SD           User Key  (r) = Recorded By, (t) = Taken By, (c) = Cosigned By    Initials Name Provider Type    Haylie Osei OT Occupational Therapist                Occupational Therapy Education                 Title: PT OT SLP Therapies (In Progress)     Topic: Occupational Therapy (In Progress)     Point: ADL training (Done)     Description:   Instruct learner(s) on proper safety adaptation and remediation techniques during self care or transfers.   Instruct in proper use of assistive devices.               Learning Progress Summary           Patient Acceptance, E,TB, VU by SD at 7/19/2022 1206    Comment: No further OT needs.                   Point: Home exercise program (Not Started)     Description:   Instruct learner(s) on appropriate technique for monitoring, assisting and/or progressing therapeutic exercises/activities.              Learner Progress:  Not documented in this visit.          Point: Precautions (Not Started)     Description:   Instruct learner(s) on prescribed precautions during self-care and functional transfers.              Learner Progress:  Not documented in this visit.          Point: Body mechanics (Not Started)     Description:   Instruct learner(s) on proper positioning and spine alignment during self-care, functional mobility activities and/or exercises.              Learner Progress:  Not documented in this visit.                      User Key     Initials Effective Dates Name Provider Type Discipline    SD 06/16/21 -  Haylie Miranda OT Occupational Therapist OT              OT Recommendation and Plan  Therapy Frequency (OT): evaluation only  Plan of Care Review  Plan of Care Reviewed With: patient  Progress: no change  Outcome Evaluation: OT eval completed. Patient completed bed mobility with mod ind, tf and walked 380' with SBA. Patient able to complete ADLs with S/U-Sup. O2 on 3L ranged 94, 93, 94 before, during, after activity. No further OT needs at this time. Patient to walk with nursing as tolerated.     Time Calculation:    Time Calculation- OT     Row Name 07/19/22 1208             Time Calculation- OT    OT Start Time 1020  -SD      OT Received On 07/19/22  -SD              Untimed Charges    OT Eval/Re-eval Minutes 30  -SD              Total Minutes    Untimed Charges Total Minutes 30  -SD       Total Minutes 30  -SD            User Key  (r) = Recorded By, (t) = Taken By, (c) = Cosigned By    Initials Name Provider Type    SD Haylie Miranda OT Occupational Therapist               Therapy Charges for Today     Code Description Service Date Service Provider Modifiers Qty    [id number]  OT EVAL LOW COMPLEXITY 2 7/19/2022 Haylie Miranda OT GO 1               Haylie Miranda OT  7/19/2022

## 2022-07-19 NOTE — PLAN OF CARE
Goal Outcome Evaluation:  Plan of Care Reviewed With: patient        Progress: no change  Outcome Evaluation: Pt seen for PT evaluation this date. Pt completed bed mobility mod ind, tf and walked 380 ft with SBA. Pt on 3L O2 with range 93-94%. Ed Pt and nursing on transferring O2 to portable tank to allow Pt to ambulate. No skilled inpatient PT needs at this time.

## 2022-07-19 NOTE — PLAN OF CARE
Goal Outcome Evaluation:  Plan of Care Reviewed With: patient        Progress: improving  Outcome Evaluation: VSS; continue lasix; cardiology consult

## 2022-07-19 NOTE — PLAN OF CARE
Goal Outcome Evaluation:      Pt improving, afib controlled, eating well, no complaints of SOA

## 2022-07-19 NOTE — PROGRESS NOTES
[facility]    Name:  Damion Todd Jr.   Age:  66 y.o.  Sex:  male  :  1955  MRN:  [id number]   Visit Number:  [id number]  Admission Date:  2022  Date Of Service:  22  Primary Care Physician:  Yasir Dougherty MD     LOS: 0 days :    Chief Complaint:      Shortness of breath    Subjective:    Patient seen at bedside.  No acute changes overnight.  Discussed diagnosis of congestive heart failure and atrial fibrillation with him.  He had no questions.    Hospital Course:    Patient is a 66-year-old morbidly obese gentleman with history of obstructive sleep apnea untreated, hyperlipidemia, hypertension, who presented to the emergency room with increasing shortness of breath.  Patient was initially hypertensive in the 178/56 range, tachypneic, and requiring up to 3 L of oxygen maintain sats greater than 90%.  He had elevated proBNP of 6000.  Troponin was 0.016.  White count of 14 with hemoglobin 12.6.  Procalcitonin lactic acid within normal limits.  Flu and COVID testing were negative.  CT scan of the chest demonstrated no evidence of PE with bilateral pleural effusions.  He was started on diuretic therapy admitted to the hospital service.  Cardiology was consulted.  Patient did have evidence of atrial fibrillation with a slow ventricular response.  He had elevated [id number] score.  Will be started on Eliquis.  His 2D echocardiogram demonstrated hyperdynamic ejection fraction of 77% with mild to moderate aortic valve stenosis and moderate right ventricular dilation.    Review of Systems:     All systems were reviewed and negative except as mentioned in subjective, assessment and plan.    Vital Signs:    Temp:  [97.2 °F (36.2 °C)-99.9 °F (37.7 °C)] 98.6 °F (37 °C)  Heart Rate:  [51-67] 62  Resp:  [16-22] 16  BP: (106-133)/(41-95) 122/61    Intake and output:    I/O last 3 completed shifts:  In: -   Out:  [Urine:]  I/O this shift:  In:  720 [P.O.:720]  Out: 1000 [Urine:1000]    Physical Examination:    General Appearance:  Alert and cooperative.  NAD   Head:  Atraumatic and normocephalic.   Eyes: Conjunctivae and sclerae normal, no icterus. No pallor.   Throat: No oral lesions, no thrush, oral mucosa moist.   Neck: Supple, trachea midline, no thyromegaly.   Lungs:    Bilateral crackles.   Heart:   Irregular normal S1 and S2, no murmur, no gallop, no rub. No JVD.   Abdomen:   Normal bowel sounds, no masses, no organomegaly. Soft, nontender, nondistended, no rebound tenderness.   Extremities: Supple, trace to 1+ edema, no cyanosis, no clubbing.   Skin: No bleeding or rash.   Neurologic: Alert and oriented x 3. No facial asymmetry. Moves all four limbs. No tremors.      Laboratory results:    Results from last 7 days   Lab Units 07/19/22  0539 07/18/22  1210   SODIUM mmol/L 137 135*   POTASSIUM mmol/L 3.8 4.4   CHLORIDE mmol/L 103 105   CO2 mmol/L 19.0* 16.7*   BUN mg/dL 22 21   CREATININE mg/dL 0.96 1.11   CALCIUM mg/dL 8.9 9.3   BILIRUBIN mg/dL  --  0.7   ALK PHOS U/L  --  87   ALT (SGPT) U/L  --  24   AST (SGOT) U/L  --  17   GLUCOSE mg/dL 102* 110*     Results from last 7 days   Lab Units 07/19/22  0539 07/18/22  1210   WBC 10*3/mm3 11.92* 14.93*   HEMOGLOBIN g/dL 12.0* 12.6*   HEMATOCRIT % 35.1* 36.5*   PLATELETS 10*3/mm3 263 278         Results from last 7 days   Lab Units 07/18/22  1210   TROPONIN T ng/mL 0.016         Results from last 7 days   Lab Units 07/18/22  1127   PH, ARTERIAL pH units 7.432   PO2 ART mm Hg 59.4*   PCO2, ARTERIAL mm Hg 29.4*   HCO3 ART mmol/L 19.6*     I have reviewed the patient's laboratory results.    Radiology results:    Adult Transthoracic Echo Complete With Contrast if Necessary Per Protocol    Result Date: 7/19/2022  · Estimated left ventricular EF = 77% Left ventricular systolic function is hyperdynamic (EF > 70%). · Left ventricular diastolic function was not assessed. · The right ventricular cavity is  moderately dilated. · Mild to moderate aortic valve stenosis is present. · There is a small (<1cm) pericardial effusion adjacent to the right atrium.      XR Chest 2 View    Result Date: 7/18/2022  PROCEDURE: XR CHEST 2 VW-   HISTORY: SOA Triage Protocol  COMPARISON: 12/03/2015.  FINDINGS:  Diffuse interstitial prominence is seen new from prior exam probably due to mild edema. No focal infiltrate is identified.  There is no evidence of effusion or other pleural disease.  The mediastinum has a normal appearance.  The cardiac silhouette is unremarkable.      Impression: Mild pulmonary edema suspected  This report was signed and finalized on 7/18/2022 11:18 AM by Ovidio Robbins MD.    CT Angiogram Chest Pulmonary Embolism    Result Date: 7/18/2022  PROCEDURE: CT ANGIOGRAM CHEST PULMONARY EMBOLISM-  HISTORY: soa  TECHNIQUE: Thin section axial CT with IV contrast supplemented with 3D reconstructed MIP images.  FINDINGS:  Pulmonary vessels enhance in a normal fashion without evidence of embolic disease. Thoracic aorta is normal in caliber without evidence of aneurysm or dissection.  No acute lung disease is present.  Small bilateral pleural effusions are present. There is no pericardial effusion.. A few borderline enlarged right paratracheal lymph nodes are noted..      Impression: 1. No evidence of pulmonary embolism. 2. Small bilateral pleural effusions    This study was performed with techniques to keep radiation doses as low as reasonably achievable (ALARA). Individualized dose reduction techniques using automated exposure control or adjustment of vA and/or kV according to the patient size were employed.  This report was signed and finalized on 7/18/2022 1:51 PM by Ovidio Robbins MD.    I have reviewed the patient's radiology reports.    Medication Review:     I have reviewed the patient's active and prn medications.     Problem List:      New onset of congestive heart failure (HCC)    Pleural effusion    Acute  pulmonary edema (HCC)    Acute respiratory failure with hypoxia (HCC)      Assessment:    1. New onset diastolic congestive heart failure with exacerbation  2. New diagnosis of atrial fibrillation, rate controlled  3. Respiratory failure with hypoxia, acute  4. Bilateral effusions  5. Morbid obesity  6. Untreated obstructive sleep apnea    Plan:      Acute hypoxic respiratory failure  Acute pulmonary edema  New diagnosis of heart failure  We will continue to wean oxygen as tolerated.  Continue with IV diuretic therapy.  2D echo results reviewed.  Cardiology consulted.     Chronic alcoholic  CIWA per protocol.      Hypertension  Hyperlipidemia  Continue home meds as indicated.     Atrial fibrillation:  New diagnosis.  Currently rate controlled.  Eliquis recommended by cardiology.    Sodium and fluid restricted diet.    DVT Prophylaxis: Eliquis  Code Status: Full  Diet: Cardiac/low-sodium  Discharge Plan: 1 to 2 days    Paula Arthur DO  07/19/22  14:54 EDT    Dictated utilizing Dragon dictation.

## 2022-07-19 NOTE — CASE MANAGEMENT/SOCIAL WORK
Discharge Planning Assessment  [facility]     Patient Name: Damion Todd Jr.  MRN: [id number]  Today's Date: 7/19/2022    Admit Date: 7/18/2022     Discharge Needs Assessment     Row Name 07/19/22 1240       Living Environment    People in Home alone    Current Living Arrangements home    Primary Care Provided by self    Provides Primary Care For no one    Family Caregiver if Needed spouse    Quality of Family Relationships supportive    Able to Return to Prior Arrangements yes    Living Arrangement Comments plans home on d/c; spouse will transport       Resource/Environmental Concerns    Resource/Environmental Concerns financial  stated they get by just not a lot left after pay bills    Transportation Concerns none       Transition Planning    Patient/Family Anticipates Transition to home    Patient/Family Anticipated Services at Transition none    Transportation Anticipated car, drives self;family or friend will provide       Discharge Needs Assessment    Readmission Within the Last 30 Days no previous admission in last 30 days    Equipment Currently Used at Home bp cuff    Concerns to be Addressed discharge planning    Anticipated Changes Related to Illness none    Equipment Needed After Discharge none  working with primary dr to get large scales    Provided Post Acute Provider List? N/A    Provided Post Acute Provider Quality & Resource List? N/A    Current Discharge Risk physical impairment               Discharge Plan     Row Name 07/19/22 1243       Plan    Plan pt resting in chair; stated plans to go home on d/c; lives alone and wife lives in another home but they are still  and help each other; she will transport him home;  no lw/poa no info wanted; cm info card given and explained; cm will continue to follow              Continued Care and Services - Admitted Since 7/18/2022    Coordination has not been started for this encounter.       Expected Discharge Date and Time     Expected Discharge  Date Expected Discharge Time    Jul 20, 2022          Demographic Summary     Row Name 07/19/22 1238       General Information    Admission Type observation    Arrived From emergency department    Referral Source admission list    Reason for Consult discharge planning    Preferred Language English       Contact Information    Permission Granted to Share Info With family/designee  spouse Asya               Functional Status     Row Name 07/19/22 1239       Functional Status    Usual Activity Tolerance moderate    Current Activity Tolerance moderate       Functional Status, IADL    Medications independent    Meal Preparation independent    Housekeeping independent    Laundry independent    Shopping independent    IADL Comments stated  but lives alone, wife lives in another apartment but they still take care of each other       Mental Status    General Appearance WDL WDL       Employment/    Employment Status disabled                    Ayla Shannon RN     FAMILY HISTORY:  Father  Still living? No  Family history of myocardial infarction, Age at diagnosis: Age Unknown

## 2022-07-19 NOTE — CONSULTS
"G-Cardiology Consult Note    Referring Provider: Sandhya  Reason for Consultation: CHF decompensation    Patient Care Team:  Yasir Dougherty MD as PCP - General (Internal Medicine)    Chief complaint : Shortness of breath    Subjective:    History of present illness: This is a 66-year-old male patient who presents to the emergency room with a 3-day history of progressively worsening dyspnea.  The patient reports shortness of breath with basic activities of daily living including bathing, dressing, eating and walking from one room to the next in his house.  He noticed orthopnea but no PND.  There was no peripheral edema.  He is a reformed smoker.  He reports drinking 2 beers per day but has drank as much is 14 beers per day.  On presentation the patient was demonstrated to have pulmonary vascular congestion.  proBNP was elevated.  Cardiac troponins were normal.  Chest x-ray demonstrated mild pulmonary edema.  He has a history of obstructive sleep apnea but does not use a CPAP device.  He is in permanent atrial fibrillation managed with a rate control and anticoagulation strategy (Eliquis and atenolol).  The patient indicates that he has been on blood thinners for atrial fibrillation for \"a long time\".  He has had no signs or symptoms to suggest stroke, TIA or other cardioembolic events.  He has had no bleeding complications related to anticoagulation therapy.  He has a history of a \"heart murmur\".  He has a history of hypertension and dyslipidemia.  An echocardiogram was performed which showed hyperdynamic left ventricular systolic function with no regional wall motion abnormalities.  There was evidence of mild-moderate valvular aortic stenosis.  Left ventricular diastolic function could not be assessed.  He was demonstrated to have prominent right ventricular enlargement with pulmonary acceleration time of 70 ms consistent with advanced pulmonary hypertension and chronic right heart failure.    Review of " Systems   Review of Systems   Constitutional: Negative for chills, diaphoresis, fever, malaise/fatigue, weight gain and weight loss.   HENT: Negative for ear discharge, hearing loss, hoarse voice and nosebleeds.    Eyes: Negative for discharge, double vision, pain and photophobia.   Cardiovascular: Positive for dyspnea on exertion and orthopnea. Negative for chest pain, claudication, cyanosis, irregular heartbeat, leg swelling, near-syncope, palpitations, paroxysmal nocturnal dyspnea and syncope.   Respiratory: Positive for shortness of breath. Negative for cough, hemoptysis, sputum production and wheezing.    Endocrine: Negative for cold intolerance, heat intolerance, polydipsia, polyphagia and polyuria.   Hematologic/Lymphatic: Negative for adenopathy and bleeding problem. Does not bruise/bleed easily.   Skin: Negative for color change, flushing, itching and rash.   Musculoskeletal: Negative for muscle cramps, muscle weakness, myalgias and stiffness.   Gastrointestinal: Negative for abdominal pain, diarrhea, hematemesis, hematochezia, nausea and vomiting.   Genitourinary: Negative for dysuria, frequency and nocturia.   Neurological: Negative for focal weakness, loss of balance, numbness, paresthesias and seizures.   Psychiatric/Behavioral: Negative for altered mental status, hallucinations and suicidal ideas.   Allergic/Immunologic: Negative for HIV exposure, hives and persistent infections.       History  Past Medical History:   Diagnosis Date   • Basal cell carcinoma    • Colon polyp 2012   • DJD (degenerative joint disease), ankle and foot, left    • Elevated LFTs    • Gout    • Heart murmur    • Hyperlipidemia    • Hypertension    • JUAN (obstructive sleep apnea)    • Sleep apnea     REPORTS HE TURNED CPAP IN, WAS UNABLE TO WEAR IT   • Snores    ,   Past Surgical History:   Procedure Laterality Date   • COLONOSCOPY  2012   • COLONOSCOPY N/A 6/20/2017    Procedure: COLONOSCOPY WITH HOT BIOPSY POLYPECTOMIES;   Surgeon: Rohith Underwood MD;  Location: [facility] ENDOSCOPY;  Service:    • FOOT SURGERY Left    ,   Family History   Problem Relation Age of Onset   • Liver disease Mother    • Alcohol abuse Mother    • Cirrhosis Mother    • Colon cancer Paternal Uncle    ,   Social History     Tobacco Use   • Smoking status: Former Smoker     Packs/day: 3.00     Years: 30.00     Pack years: 90.00     Types: Cigarettes     Quit date:      Years since quittin.5   • Smokeless tobacco: Never Used   Vaping Use   • Vaping Use: Never used   Substance Use Topics   • Alcohol use: Yes     Alcohol/week: 6.0 standard drinks     Types: 6 Cans of beer per week     Comment: 6-7 beers per day   • Drug use: No   ,   Medications Prior to Admission   Medication Sig Dispense Refill Last Dose   • allopurinol (ZYLOPRIM) 300 MG tablet Take 300 mg by mouth Daily.   2022 at 0700   • ASPIRIN LOW DOSE 81 MG EC tablet    2022 at 0700   • atenolol (TENORMIN) 25 MG tablet Take 25 mg by mouth Daily.   2022 at 0700   • atorvastatin (LIPITOR) 40 MG tablet Take 40 mg by mouth Daily.   2022 at 0700   • levothyroxine (SYNTHROID, LEVOTHROID) 50 MCG tablet Take 50 mcg by mouth Daily.   2022 at 0700   • lisinopril (PRINIVIL,ZESTRIL) 20 MG tablet Take 20 mg by mouth Daily.   2022 at 0700   • bisacodyl (Dulcolax) 5 MG EC tablet Follow instructions given at office 4 tablet 0 More than a month at Unknown time   • hydrOXYzine (ATARAX) 25 MG tablet    More than a month at Unknown time   • polyethylene glycol (MIRALAX) 17 GM/SCOOP powder Follow directions given at office 238 g 0 More than a month at Unknown time   • triamcinolone (KENALOG) 0.1 % cream Apply  topically 3 (Three) Times a Day. Apply a very thin layer of cream over affected area 3 times a day 45 g 0 More than a month at Unknown time    and Allergies:  Patient has no known allergies.    Objective:    Vital Sign Min/Max for last 24 hours  Temp  Min: 97.2 °F (36.2 °C)  Max: 100.3  "°F (37.9 °C)   BP  Min: 106/95  Max: 178/56   Pulse  Min: 51  Max: 67   Resp  Min: 17  Max: 24   SpO2  Min: 89 %  Max: 97 %   Flow (L/min)  Min: 3  Max: 3   Weight  Min: 132 kg (290 lb)  Max: 137 kg (302 lb 7.5 oz)     Flowsheet Rows    Flowsheet Row First Filed Value   Admission Height 182.9 cm (72\") Documented at 07/18/2022 1027   Admission Weight 132 kg (290 lb) Documented at 07/18/2022 1027             Echo EF Estimated  Lab Results   Component Value Date    ECHOEFEST 77 07/19/2022       Physical Exam:   Vitals and nursing note reviewed.   Constitutional:       Appearance: Healthy appearance. Not in distress.   Neck:      Vascular: No JVR. JVD normal.   Pulmonary:      Effort: Pulmonary effort is normal.      Breath sounds: Normal breath sounds. No wheezing. No rhonchi. No rales.   Chest:      Chest wall: Not tender to palpatation.   Cardiovascular:      PMI at left midclavicular line. Normal rate. Regular rhythm. Normal S1. Normal S2.      Murmurs: There is a grade 1/6 harsh crescendo-decrescendo midsystolic murmur at the URSB, radiating to the neck.      No gallop. No click. No rub.   Pulses:     Intact distal pulses.   Edema:     Peripheral edema absent.   Abdominal:      General: Bowel sounds are normal.      Palpations: Abdomen is soft.      Tenderness: There is no abdominal tenderness.   Musculoskeletal: Normal range of motion.         General: No tenderness. Skin:     General: Skin is warm and dry.   Neurological:      General: No focal deficit present.      Mental Status: Alert and oriented to person, place and time.         Results Review:   I reviewed the patient's new clinical results.  Results from last 7 days   Lab Units 07/19/22  0539 07/18/22  1210   WBC 10*3/mm3 11.92* 14.93*   HEMOGLOBIN g/dL 12.0* 12.6*   HEMATOCRIT % 35.1* 36.5*   PLATELETS 10*3/mm3 263 278     Results from last 7 days   Lab Units 07/19/22  0539 07/18/22  1210   SODIUM mmol/L 137 135*   POTASSIUM mmol/L 3.8 4.4   CHLORIDE " mmol/L 103 105   CO2 mmol/L 19.0* 16.7*   BUN mg/dL 22 21   CREATININE mg/dL 0.96 1.11   GLUCOSE mg/dL 102* 110*   CALCIUM mg/dL 8.9 9.3     Lab Results   Lab Value Date/Time    TROPONINT 0.016 07/18/2022 1210             Assessment/Plan:      New onset of congestive heart failure (HCC).  Acute on chronic left ventricular diastolic heart failure.  Heart failure with preserved ejection fraction.  Stage D.  New York Heart Association functional class IV symptoms on presentation.  Volume expanded.  Etiology: Combination of hypertensive heart disease and valvular heart disease.    Pleural effusion    Acute pulmonary edema (HCC)    Acute respiratory failure with hypoxia (HCC)    Essential hypertension.  Well-controlled.    Morbid obesity.    Obstructive sleep apnea with suspected component of obesity related alveolar hypoventilation syndrome.  Noncompliant with CPAP therapy.    Advanced chronic cor pulmonale.    Permanent atrial fibrillation.  Asymptomatic.  Rate control and anticoagulation strategy.    Alcoholism    Senile calcific valvular aortic stenosis.  Mild-moderate severity.      The patient has been counseled regarding the essential need to discontinue alcohol consumption particularly beer due to its high sodium content.    I have recommended a 1.5 L/day fluid restriction and less than 1200 mg/day sodium restriction.    After a period of adequate diuresis with parenteral loop diuretics, I would recommend the patient be discharged to home with Demadex 60 mg orally once in AM.  All other cardiac medications including Eliquis, baby aspirin, Lipitor, atenolol and lisinopril will continue unchanged.    No further cardiovascular testing is indicated.    Outpatient follow-up in cardiology clinic within 1 month of discharge.    I discussed the patient's findings and my recommendations with patient    Scott Trinidad MD  07/19/22  10:22 EDT

## 2022-07-19 NOTE — PLAN OF CARE
Goal Outcome Evaluation:  Plan of Care Reviewed With: patient        Progress: no change  Outcome Evaluation: OT eval completed. Patient completed bed mobility with mod ind, tf and walked 380' with SBA. Patient able to complete ADLs with S/U-Sup. O2 on 3L ranged 94, 93, 94 before, during, after activity. No further OT needs at this time. Patient to walk with nursing as tolerated.

## 2022-07-19 NOTE — THERAPY DISCHARGE NOTE
Patient Name: Damion Todd Jr.  : 1955    MRN: [id number]                              Today's Date: 2022       Admit Date: 2022    Visit Dx:     ICD-10-CM ICD-9-CM   1. Pleural effusion  J90 511.9   2. Acute congestive heart failure, unspecified heart failure type (HCC)  I50.9 428.0     Patient Active Problem List   Diagnosis   • Colon cancer screening   • Bright red blood per rectum   • Elevated liver function tests   • History of adenomatous polyp of colon   • Family history of colon cancer   • Pleural effusion   • New onset of congestive heart failure (HCC)   • Acute pulmonary edema (HCC)   • Acute respiratory failure with hypoxia (HCC)     Past Medical History:   Diagnosis Date   • Basal cell carcinoma    • Colon polyp    • DJD (degenerative joint disease), ankle and foot, left    • Elevated LFTs    • Gout    • Heart murmur    • Hyperlipidemia    • Hypertension    • JUAN (obstructive sleep apnea)    • Sleep apnea     REPORTS HE TURNED CPAP IN, WAS UNABLE TO WEAR IT   • Snores      Past Surgical History:   Procedure Laterality Date   • COLONOSCOPY     • COLONOSCOPY N/A 2017    Procedure: COLONOSCOPY WITH HOT BIOPSY POLYPECTOMIES;  Surgeon: Rohith Underwood MD;  Location: [facility] ENDOSCOPY;  Service:    • FOOT SURGERY Left       General Information     Row Name 22 1433          Physical Therapy Time and Intention    Document Type discharge evaluation/summary  -MS     Mode of Treatment physical therapy  -MS     Row Name 22 1433          General Information    Patient Profile Reviewed yes  -MS     Prior Level of Function independent:;all household mobility;community mobility  -MS     Existing Precautions/Restrictions oxygen therapy device and L/min  -MS     Barriers to Rehab none identified  -MS     Row Name 22 1433          Living Environment    People in Home alone  -MS     Row Name 22 1433          Home Main Entrance    Number of Stairs, Main Entrance three   -MS     Stair Railings, Main Entrance railings safe and in good condition  -MS     Row Name 07/19/22 1433          Stairs Within Home, Primary    Number of Stairs, Within Home, Primary none  -MS     Row Name 07/19/22 1433          Cognition    Orientation Status (Cognition) oriented x 4  -MS     Row Name 07/19/22 1433          Safety Issues, Functional Mobility    Safety Issues Affecting Function (Mobility) unable to maintain weight-bearing restrictions  -MS     Impairments Affecting Function (Mobility) shortness of breath;endurance/activity tolerance  -MS           User Key  (r) = Recorded By, (t) = Taken By, (c) = Cosigned By    Initials Name Provider Type    MS Rene Francisco, PT Physical Therapist               Mobility     Row Name 07/19/22 1435          Bed Mobility    Bed Mobility bed mobility (all) activities  -MS     All Activities, [place] (Bed Mobility) modified independence  -MS     Row Name 07/19/22 1435          Sit-Stand Transfer    Sit-Stand [place] (Transfers) standby assist  -MS     Row Name 07/19/22 1435          Gait/Stairs (Locomotion)    [place] Level (Gait) standby assist  -MS     Assistive Device (Gait) other (see comments)  gait belt  -MS     Distance in Feet (Gait) 380ft  -MS           User Key  (r) = Recorded By, (t) = Taken By, (c) = Cosigned By    Initials Name Provider Type    Rene Lozada, PT Physical Therapist               Obj/Interventions     Row Name 07/19/22 1436          Range of Motion Comprehensive    General Range of Motion bilateral lower extremity ROM WFL  -MS     Row Name 07/19/22 1436          Strength Comprehensive (MMT)    General Manual Muscle Testing (MMT) Assessment lower extremity strength deficits identified  -MS     Comment, General Manual Muscle Testing (MMT) Assessment B LE 4-/5  -MS     Row Name 07/19/22 1436          Balance    Balance Assessment sit to stand dynamic balance  -MS     Sit to Stand Dynamic Balance standby assist  -MS      Row Name 07/19/22 1436          Sensory Assessment (Somatosensory)    Sensory Assessment (Somatosensory) sensation intact  -MS           User Key  (r) = Recorded By, (t) = Taken By, (c) = Cosigned By    Initials Name Provider Type    Rene Lozada, PT Physical Therapist               Goals/Plan    No documentation.                Clinical Impression     Row Name 07/19/22 1439          Pain    Pretreatment Pain Rating 0/10 - no pain  -MS     Posttreatment Pain Rating 0/10 - no pain  -MS     Row Name 07/19/22 1439          Plan of Care Review    Plan of Care Reviewed With patient  -MS     Progress no change  -MS     Outcome Evaluation Pt seen for PT evaluation this date. Pt completed bed mobility mod ind, tf and walked 380 ft with SBA. Pt on 3L O2 with range 93-94%. Ed Pt and nursing on transferring O2 to portable tank to allow Pt to ambulate. No skilled inpatient PT needs at this time.  -MS     Row Name 07/19/22 1439          Therapy Assessment/Plan (PT)    Patient/Family Therapy Goals Statement (PT) to go home  -MS     Criteria for Skilled Interventions Met (PT) no;no problems identified which require skilled intervention  -MS     Row Name 07/19/22 1439          Vital Signs    Pre SpO2 (%) 94  -MS     O2 Delivery Pre Treatment supplemental O2  3L  -MS     Intra SpO2 (%) 93  -MS     O2 Delivery Intra Treatment supplemental O2  3L  -MS     Post SpO2 (%) 94  -MS     O2 Delivery Post Treatment supplemental O2  3L  -MS     Pre Patient Position Supine  -MS     Intra Patient Position Standing  -MS     Post Patient Position Sitting  -MS     Row Name 07/19/22 1439          Positioning and Restraints    Pre-Treatment Position in bed  -MS     Post Treatment Position chair  -MS     In Chair call light within reach;encouraged to call for assist;sitting  -MS           User Key  (r) = Recorded By, (t) = Taken By, (c) = Cosigned By    Initials Name Provider Type    Rene Lozada, PT Physical Therapist                Outcome Measures     Row Name 07/19/22 1444          How much help from another person do you currently need...    Turning from your back to your side while in flat bed without using bedrails? 4  -MS     Moving from lying on back to sitting on the side of a flat bed without bedrails? 4  -MS     Moving to and from a bed to a chair (including a wheelchair)? 4  -MS     Standing up from a chair using your arms (e.g., wheelchair, bedside chair)? 4  -MS     Climbing 3-5 steps with a railing? 4  -MS     To walk in hospital room? 4  -MS     AM-PAC 6 Clicks Score (PT) 24  -MS     Highest level of mobility 8 --> Walked 250 feet or more  -MS     Row Name 07/19/22 1444 07/19/22 1206       Functional Assessment    Outcome Measure Options AM-PAC 6 Clicks Basic Mobility (PT)  -MS AM-PAC 6 Clicks Daily Activity (OT)  -SD          User Key  (r) = Recorded By, (t) = Taken By, (c) = Cosigned By    Initials Name Provider Type    SD Haylie Miranda, OT Occupational Therapist    Rene Lozada, PT Physical Therapist              Physical Therapy Education                 Title: PT OT SLP Therapies (In Progress)     Topic: Physical Therapy (In Progress)     Point: Mobility training (Done)     Learning Progress Summary           Patient Acceptance, E, VU by MS at 7/19/2022 1445    Comment: importance of mobility                   Point: Home exercise program (Done)     Learning Progress Summary           Patient Acceptance, E, VU by MS at 7/19/2022 1445    Comment: importance of mobility                   Point: Body mechanics (Not Started)     Learner Progress:  Not documented in this visit.          Point: Precautions (Not Started)     Learner Progress:  Not documented in this visit.                      User Key     Initials Effective Dates Name Provider Type Discipline    MS 07/01/22 -  Rene Francisco, DOUG Physical Therapist PT              PT Recommendation and Plan     Plan of Care Reviewed With: patient  Progress: no  change  Outcome Evaluation: Pt seen for PT evaluation this date. Pt completed bed mobility mod ind, tf and walked 380 ft with SBA. Pt on 3L O2 with range 93-94%. Ed Pt and nursing on transferring O2 to portable tank to allow Pt to ambulate. No skilled inpatient PT needs at this time.     Time Calculation:    PT Charges     Row Name 07/19/22 1446             Time Calculation    Start Time 1018  -MS      PT Received On 07/19/22  -MS              Untimed Charges    PT Eval/Re-eval Minutes 40  -MS              Total Minutes    Untimed Charges Total Minutes 40  -MS       Total Minutes 40  -MS            User Key  (r) = Recorded By, (t) = Taken By, (c) = Cosigned By    Initials Name Provider Type    MS Rene Francisco, PT Physical Therapist              Therapy Charges for Today     Code Description Service Date Service Provider Modifiers Qty    46799323020 HC PT EVAL LOW COMPLEXITY 3 7/19/2022 Rene Francisco PT GP 1          PT G-Codes  Outcome Measure Options: AM-PAC 6 Clicks Basic Mobility (PT)  AM-PAC 6 Clicks Score (PT): 24  AM-PAC 6 Clicks Score (OT): 23    PT Discharge Summary  Anticipated Discharge Disposition (PT): home    Rene Francisco PT  7/19/2022

## 2022-07-20 VITALS
WEIGHT: 298 LBS | BODY MASS INDEX: 40.36 KG/M2 | SYSTOLIC BLOOD PRESSURE: 119 MMHG | TEMPERATURE: 98.3 F | HEART RATE: 52 BPM | DIASTOLIC BLOOD PRESSURE: 56 MMHG | RESPIRATION RATE: 20 BRPM | HEIGHT: 72 IN | OXYGEN SATURATION: 94 %

## 2022-07-20 LAB
ANION GAP SERPL CALCULATED.3IONS-SCNC: 13.4 MMOL/L (ref 5–15)
BUN SERPL-MCNC: 30 MG/DL (ref 8–23)
BUN/CREAT SERPL: 28.6 (ref 7–25)
CALCIUM SPEC-SCNC: 8.9 MG/DL (ref 8.6–10.5)
CHLORIDE SERPL-SCNC: 104 MMOL/L (ref 98–107)
CO2 SERPL-SCNC: 20.6 MMOL/L (ref 22–29)
CREAT SERPL-MCNC: 1.05 MG/DL (ref 0.76–1.27)
EGFRCR SERPLBLD CKD-EPI 2021: 78.8 ML/MIN/1.73
GLUCOSE SERPL-MCNC: 100 MG/DL (ref 65–99)
POTASSIUM SERPL-SCNC: 4 MMOL/L (ref 3.5–5.2)
QT INTERVAL: 484 MS
QTC INTERVAL: 463 MS
SODIUM SERPL-SCNC: 138 MMOL/L (ref 136–145)

## 2022-07-20 PROCEDURE — 25010000002 FUROSEMIDE PER 20 MG: Performed by: FAMILY MEDICINE

## 2022-07-20 PROCEDURE — 96376 TX/PRO/DX INJ SAME DRUG ADON: CPT

## 2022-07-20 PROCEDURE — 80048 BASIC METABOLIC PNL TOTAL CA: CPT | Performed by: FAMILY MEDICINE

## 2022-07-20 PROCEDURE — 94618 PULMONARY STRESS TESTING: CPT

## 2022-07-20 PROCEDURE — 99217 PR OBSERVATION CARE DISCHARGE MANAGEMENT: CPT | Performed by: FAMILY MEDICINE

## 2022-07-20 PROCEDURE — G0378 HOSPITAL OBSERVATION PER HR: HCPCS

## 2022-07-20 RX ORDER — TORSEMIDE 20 MG/1
60 TABLET ORAL DAILY
Qty: 90 TABLET | Refills: 0 | Status: SHIPPED | OUTPATIENT
Start: 2022-07-20 | End: 2022-08-22

## 2022-07-20 RX ADMIN — LEVOTHYROXINE SODIUM 50 MCG: 50 TABLET ORAL at 09:10

## 2022-07-20 RX ADMIN — ASPIRIN 81 MG: 81 TABLET, COATED ORAL at 09:08

## 2022-07-20 RX ADMIN — FOLIC ACID 1 MG: 1 TABLET ORAL at 09:08

## 2022-07-20 RX ADMIN — ATORVASTATIN CALCIUM 40 MG: 40 TABLET, FILM COATED ORAL at 09:09

## 2022-07-20 RX ADMIN — APIXABAN 5 MG: 5 TABLET, FILM COATED ORAL at 09:10

## 2022-07-20 RX ADMIN — FUROSEMIDE 40 MG: 10 INJECTION, SOLUTION INTRAMUSCULAR; INTRAVENOUS at 06:02

## 2022-07-20 RX ADMIN — ALLOPURINOL 300 MG: 100 TABLET ORAL at 09:08

## 2022-07-20 RX ADMIN — ATENOLOL 25 MG: 25 TABLET ORAL at 09:09

## 2022-07-20 RX ADMIN — Medication 10 ML: at 09:10

## 2022-07-20 RX ADMIN — LISINOPRIL 20 MG: 20 TABLET ORAL at 09:10

## 2022-07-20 NOTE — PLAN OF CARE
Goal Outcome Evaluation:  Plan of Care Reviewed With: patient        Progress: improving  Outcome Evaluation: VSS; remains Afib; no acute events overnight

## 2022-07-20 NOTE — PROGRESS NOTES
Exercise Oximetry    Patient Name:Damion Todd Jr.   MRN: [id number]   Date: 07/20/22             ROOM AIR BASELINE   SpO2% 92   Heart Rate 74   Blood Pressure      EXERCISE ON ROOM AIR SpO2% EXERCISE ON O2 @ 2 LPM SpO2%   1 MINUTE 92 1 MINUTE 94   2 MINUTES 90 2 MINUTES 94   3 MINUTES 90 3 MINUTES 95   4 MINUTES 89 4 MINUTES 94   5 MINUTES 86 5 MINUTES 92   6 MINUTES  6 MINUTES 92              Distance Walked   Distance Walked 250ft   Dyspnea (Kimber Scale)   Dyspnea (Kimber Scale) 4   Fatigue (Kimber Scale)   Fatigue (Kimber Scale)2   SpO2% Post Exercise   SpO2% Post Exercise 92   HR Post Exercise  HR Post Exercise 78   Time to Recovery  Time to Recovery     Comments: pt need 2 LPM NC on exertion

## 2022-07-20 NOTE — DISCHARGE SUMMARY
[facility]   DISCHARGE SUMMARY      Name:  Damion Todd Jr.   Age:  66 y.o.  Sex:  male  :  1955  MRN:  [id number]   Visit Number:  [id number]    Admission Date:  2022  Date of Discharge:  2022  Primary Care Physician:  Yasir Dougherty MD    Important issues to note:    Follow-up with PCP and cardiology as directed.  Monitor weight at home.  Fluid and sodium restricted diet as discussed.    Discharge Diagnoses:     1. New onset diastolic congestive heart failure with exacerbation  2. New diagnosis of atrial fibrillation, rate controlled  3. Respiratory failure with hypoxia, acute  4. Bilateral effusions  5. Morbid obesity  6. Untreated obstructive sleep apnea      Problem List:     Active Hospital Problems    Diagnosis  POA   • **New onset of congestive heart failure (HCC) [I50.9]  Unknown   • Pleural effusion [J90]  Yes   • Acute pulmonary edema (HCC) [J81.0]  Unknown   • Acute respiratory failure with hypoxia (HCC) [J96.01]  Unknown      Resolved Hospital Problems   No resolved problems to display.     Presenting Problem:    Chief Complaint   Patient presents with   • Shortness of Breath      Consults:     Consulting Physician(s)  Chat With All Active Members    Provider Relationship Specialty    Breeding, Scott ATKINS MD  Consulting Physician Cardiology        Procedures Performed:        History of presenting illness/Hospital Course:    Patient is a 66-year-old morbidly obese gentleman with history of obstructive sleep apnea untreated, hyperlipidemia, hypertension, who presented to the emergency room with increasing shortness of breath.  Patient was initially hypertensive in the 178/56 range, tachypneic, and requiring up to 3 L of oxygen maintain sats greater than 90%.  He had elevated proBNP of 6000.  Troponin was 0.016.  White count of 14 with hemoglobin 12.6.  Procalcitonin lactic acid within normal limits.  Flu and COVID testing were negative.  CT scan of  the chest demonstrated no evidence of PE with bilateral pleural effusions.  He was started on diuretic therapy admitted to the hospital service.  Cardiology was consulted.  Patient did have evidence of atrial fibrillation with a slow ventricular response.  He had elevated [id number] score.  Will be started on Eliquis.  His 2D echocardiogram demonstrated hyperdynamic ejection fraction of 77% with mild to moderate aortic valve stenosis and moderate right ventricular dilation.  Cardiology recommending continuation of diuretic therapy in addition to the Eliquis.    Patient is overall doing well today.  He is diuresed well.  He will need oxygen with exertion at this time.  Strongly encouraged him to follow-up with Dr. Dougherty to have a another sleep study and get a CPAP back.  Encouraged him to weigh himself at home as directed after they obtain him a new scale.  We talked about fluid and sodium restricted diet.  He will follow-up with Dr. Trinidad in 1 month.  All questions answered    Vital Signs:    Temp:  [98 °F (36.7 °C)-98.6 °F (37 °C)] 98.3 °F (36.8 °C)  Heart Rate:  [52-68] 52  Resp:  [16-20] 20  BP: (103-125)/(47-61) 119/56    Physical Exam:    General Appearance:  Alert and cooperative.  NAD   Head:  Atraumatic and normocephalic.   Eyes: Conjunctivae and sclerae normal, no icterus. No pallor.   Ears:  Ears with no abnormalities noted.   Throat: No oral lesions, no thrush, oral mucosa moist.   Neck: Supple, trachea midline, no thyromegaly.   Back:   No kyphoscoliosis present. No tenderness to palpation.   Lungs:   Breath sounds heard bilaterally equally.  No crackles or wheezing. No Pleural rub or bronchial breathing.   Heart:  Normal S1 and S2, no murmur, no gallop, no rub. No JVD.   Abdomen:   Normal bowel sounds, no masses, no organomegaly. Soft, nontender, nondistended, no rebound tenderness.   Extremities: Supple, no edema, no cyanosis, no clubbing.   Pulses: Pulses palpable bilaterally.   Skin: No  bleeding or rash.   Neurologic: Alert and oriented x 3. No facial asymmetry. Moves all four limbs. No tremors.     Pertinent Lab Results:     Results from last 7 days   Lab Units 07/20/22  0534 07/19/22  0539 07/18/22  1210   SODIUM mmol/L 138 137 135*   POTASSIUM mmol/L 4.0 3.8 4.4   CHLORIDE mmol/L 104 103 105   CO2 mmol/L 20.6* 19.0* 16.7*   BUN mg/dL 30* 22 21   CREATININE mg/dL 1.05 0.96 1.11   CALCIUM mg/dL 8.9 8.9 9.3   BILIRUBIN mg/dL  --   --  0.7   ALK PHOS U/L  --   --  87   ALT (SGPT) U/L  --   --  24   AST (SGOT) U/L  --   --  17   GLUCOSE mg/dL 100* 102* 110*     Results from last 7 days   Lab Units 07/19/22  0539 07/18/22  1210   WBC 10*3/mm3 11.92* 14.93*   HEMOGLOBIN g/dL 12.0* 12.6*   HEMATOCRIT % 35.1* 36.5*   PLATELETS 10*3/mm3 263 278         Results from last 7 days   Lab Units 07/18/22  1210   TROPONIN T ng/mL 0.016     Results from last 7 days   Lab Units 07/18/22  1210   PROBNP pg/mL 6,091.0*             Results from last 7 days   Lab Units 07/18/22  1127   PH, ARTERIAL pH units 7.432   PO2 ART mm Hg 59.4*   PCO2, ARTERIAL mm Hg 29.4*   HCO3 ART mmol/L 19.6*           Pertinent Radiology Results:    Imaging Results (All)     Procedure Component Value Units Date/Time    CT Angiogram Chest Pulmonary Embolism [[id number]] Collected: 07/18/22 1350     Updated: 07/18/22 1353    Narrative:      PROCEDURE: CT ANGIOGRAM CHEST PULMONARY EMBOLISM-     HISTORY: soa     TECHNIQUE: Thin section axial CT with IV contrast supplemented with 3D  reconstructed MIP images.     FINDINGS:     Pulmonary vessels enhance in a normal fashion without evidence of  embolic disease. Thoracic aorta is normal in caliber without evidence of  aneurysm or dissection.     No acute lung disease is present.  Small bilateral pleural effusions are  present. There is no pericardial effusion.. A few borderline enlarged  right paratracheal lymph nodes are noted..       Impression:      1. No evidence of pulmonary embolism.  2. Small  bilateral pleural effusions           This study was performed with techniques to keep radiation doses as low  as reasonably achievable (ALARA). Individualized dose reduction  techniques using automated exposure control or adjustment of vA and/or  kV according to the patient size were employed.      This report was signed and finalized on 7/18/2022 1:51 PM by Ovidio Robbins MD.    XR Chest 2 View [[id number]] Collected: 07/18/22 1117     Updated: 07/18/22 1120    Narrative:      PROCEDURE: XR CHEST 2 VW-       HISTORY: SOA Triage Protocol     COMPARISON: 12/03/2015.     FINDINGS:  Diffuse interstitial prominence is seen new from prior exam  probably due to mild edema. No focal infiltrate is identified.     There is no evidence of effusion or other pleural disease.  The  mediastinum has a normal appearance.      The cardiac silhouette is unremarkable.       Impression:      Mild pulmonary edema suspected     This report was signed and finalized on 7/18/2022 11:18 AM by Ovidio Robbins MD.          Echo:    Results for orders placed during the hospital encounter of 07/18/22    Adult Transthoracic Echo Complete With Contrast if Necessary Per Protocol    Interpretation Summary  · Estimated left ventricular EF = 77% Left ventricular systolic function is hyperdynamic (EF > 70%).  · Left ventricular diastolic function was not assessed.  · The right ventricular cavity is moderately dilated.  · Mild to moderate aortic valve stenosis is present.  · There is a small (<1cm) pericardial effusion adjacent to the right atrium.    Condition on Discharge:      Stable.    Code status during the hospital stay:    Code Status and Medical Interventions:   Ordered at: 07/18/22 1623     Code Status (Patient has no pulse and is not breathing):    CPR (Attempt to Resuscitate)     Medical Interventions (Patient has pulse or is breathing):    Full Support     Discharge Disposition:    Home or Self Care    Discharge Medications:       Discharge  Medications      New Medications      Instructions Start Date   apixaban 5 MG tablet tablet  Commonly known as: ELIQUIS   5 mg, Oral, Every 12 Hours Scheduled      torsemide 20 MG tablet  Commonly known as: DEMADEX   60 mg, Oral, Daily         Continue These Medications      Instructions Start Date   allopurinol 300 MG tablet  Commonly known as: ZYLOPRIM   300 mg, Oral, Daily      ASPIRIN LOW DOSE 81 MG EC tablet  Generic drug: aspirin   No dose, route, or frequency recorded.      atenolol 25 MG tablet  Commonly known as: TENORMIN   25 mg, Oral, Daily      atorvastatin 40 MG tablet  Commonly known as: LIPITOR   40 mg, Oral, Daily      Dulcolax 5 MG EC tablet  Generic drug: bisacodyl   Follow instructions given at office      hydrOXYzine 25 MG tablet  Commonly known as: ATARAX   No dose, route, or frequency recorded.      levothyroxine 50 MCG tablet  Commonly known as: SYNTHROID, LEVOTHROID   50 mcg, Oral, Daily      lisinopril 20 MG tablet  Commonly known as: PRINIVIL,ZESTRIL   20 mg, Oral, Daily      polyethylene glycol 17 GM/SCOOP powder  Commonly known as: MIRALAX   Follow directions given at office      triamcinolone 0.1 % cream  Commonly known as: KENALOG   Topical, 3 Times Daily, Apply a very thin layer of cream over affected area 3 times a day           Discharge Diet:   Cardiac, fluid restricted    Activity at Discharge:     As tolerated  Follow-up Appointments:     Follow-up Information     Yasir Dougherty MD Follow up in 1 week(s).    Specialty: Internal Medicine  Contact information:  [address] [place] [id number] [phone]                       No future appointments.  Test Results Pending at Discharge:           Paula Arthur DO  07/20/22  10:21 EDT    Time: I spent 24 minutes on this discharge activity which included: face-to-face encounter with the patient, reviewing the data in the system, coordination of the care with the nursing staff as well as consultants,  documentation, and entering orders.     Dictated utilizing Dragon dictation.

## 2022-07-20 NOTE — PLAN OF CARE
Goal Outcome Evaluation:      Pt being discharged home, family transporting him, pt is going home with home O2, 2L on exertion.

## 2022-07-21 ENCOUNTER — READMISSION MANAGEMENT (OUTPATIENT)
Dept: CALL CENTER | Facility: HOSPITAL | Age: 67
End: 2022-07-21

## 2022-07-21 NOTE — OUTREACH NOTE
Prep Survey    Flowsheet Row Responses   [facility] patient discharged from? [facility]   Is LACE score < 7 ? Yes   Emergency Room discharge w/ pulse ox? No   Eligibility Readm Mgmt   Discharge diagnosis New onset diastolic congestive heart failure with exacerbation   Does the patient have one of the following disease processes/diagnoses(primary or secondary)? CHF   Does the patient have Home health ordered? No   Is there a DME ordered? Yes   What DME was ordered? [facility]    Prep survey completed? Yes          ALEJO ATKINS - Registered Nurse

## 2022-07-25 ENCOUNTER — READMISSION MANAGEMENT (OUTPATIENT)
Dept: CALL CENTER | Facility: HOSPITAL | Age: 67
End: 2022-07-25

## 2022-07-25 NOTE — OUTREACH NOTE
CHF Week 1 Survey    Flowsheet Row Responses   [facility] patient discharged from? [facility]   Does the patient have one of the following disease processes/diagnoses(primary or secondary)? CHF   CHF Week 1 attempt successful? Yes   Call start time 1514   Call end time 1518   Discharge diagnosis New onset diastolic congestive heart failure with exacerbation   Is patient permission given to speak with other caregiver? Yes   List who call center can speak with wife   Meds reviewed with patient/caregiver? Yes   Is the patient having any side effects they believe may be caused by any medication additions or changes? No   Does the patient have all medications ordered at discharge? Yes   Is the patient taking all medications as directed (includes completed medication regime)? Yes   Does the patient have a primary care provider?  Yes   Does the patient have an appointment with their PCP within 7 days of discharge? Yes   Has the patient kept scheduled appointments due by today? N/A   Psychosocial issues? No   Comments Denies edema or SOA.    Did the patient receive a copy of their discharge instructions? Yes   Nursing interventions Reviewed instructions with patient   What is the patient's perception of their health status since discharge? Improving   Nursing interventions Nurse provided patient education   Is the patient weighing daily? No   Does the patient have scales? No   Is the patient able to teach back Heart Failure diet management? Yes   Is the patient able to teach back signs and symptoms of worsening condition? (i.e. weight gain, shortness of air, etc.) Yes   If the patient is a current smoker, are they able to teach back resources for cessation? Not a smoker   Is the patient/caregiver able to teach back the hierarchy of who to call/visit for symptoms/problems? PCP, Specialist, Home health nurse, Urgent Care, ED, 911 Yes    CHF Week 1 call completed? Yes          FRANCO FLANNERY - Registered Nurse

## 2022-08-03 ENCOUNTER — READMISSION MANAGEMENT (OUTPATIENT)
Dept: CALL CENTER | Facility: HOSPITAL | Age: 67
End: 2022-08-03

## 2022-08-03 NOTE — OUTREACH NOTE
CHF Week 3 Survey    Flowsheet Row Responses   [facility] facility patient discharged from? [facility]   Does the patient have one of the following disease processes/diagnoses(primary or secondary)? CHF   Week 3 attempt successful? No   Unsuccessful attempts Attempt 1   Discharge diagnosis New onset diastolic congestive heart failure with exacerbation          JOSE G DOHERTY - Registered Nurse

## 2022-08-05 ENCOUNTER — TELEPHONE (OUTPATIENT)
Dept: GASTROENTEROLOGY | Facility: CLINIC | Age: 67
End: 2022-08-05

## 2022-08-05 NOTE — TELEPHONE ENCOUNTER
The patient's procedure has been postponed, due to new start of blood thinners, recent diagnosis of chf and a fib.  He needs to wait until he has been on the blood thinners 6 months per Gemma.

## 2022-08-22 ENCOUNTER — OFFICE VISIT (OUTPATIENT)
Dept: CARDIOLOGY | Facility: CLINIC | Age: 67
End: 2022-08-22

## 2022-08-22 VITALS
OXYGEN SATURATION: 97 % | HEIGHT: 72 IN | WEIGHT: 282 LBS | DIASTOLIC BLOOD PRESSURE: 60 MMHG | SYSTOLIC BLOOD PRESSURE: 120 MMHG | HEART RATE: 105 BPM | BODY MASS INDEX: 38.19 KG/M2

## 2022-08-22 DIAGNOSIS — E66.2 CLASS 2 OBESITY WITH ALVEOLAR HYPOVENTILATION, SERIOUS COMORBIDITY, AND BODY MASS INDEX (BMI) OF 39.0 TO 39.9 IN ADULT: ICD-10-CM

## 2022-08-22 DIAGNOSIS — I50.32 CHRONIC DIASTOLIC CONGESTIVE HEART FAILURE: Primary | ICD-10-CM

## 2022-08-22 PROBLEM — J81.0 ACUTE PULMONARY EDEMA: Status: RESOLVED | Noted: 2022-07-18 | Resolved: 2022-08-22

## 2022-08-22 PROBLEM — J96.01 ACUTE RESPIRATORY FAILURE WITH HYPOXIA (HCC): Status: RESOLVED | Noted: 2022-07-18 | Resolved: 2022-08-22

## 2022-08-22 PROBLEM — E66.812 CLASS 2 OBESITY WITH ALVEOLAR HYPOVENTILATION, SERIOUS COMORBIDITY, AND BODY MASS INDEX (BMI) OF 39.0 TO 39.9 IN ADULT: Status: ACTIVE | Noted: 2022-08-22

## 2022-08-22 PROCEDURE — 99213 OFFICE O/P EST LOW 20 MIN: CPT | Performed by: INTERNAL MEDICINE

## 2022-08-22 NOTE — PROGRESS NOTES
Subjective:     Encounter Date:08/22/2022      Patient ID: Damion Todd Jr. is a 67 y.o. male.    Chief Complaint: Congestive heart failure  HPI  This is a 67-year-old male patient who presents to cardiology clinic after recent hospitalization for new onset congestive heart failure.  The patient has a longstanding history of alcohol abuse.  The patient initially reported that he had discontinued all forms of alcohol consumption since hospital discharge 1 to 2 weeks ago.  On further questioning he indicates that he is continuing to drink beer.  The patient indicates that previously he was drinking up to a half a gallon of distilled spirits and as many as 30 beers per day.  He indicates that he has done well symptomatically since hospital discharge.  He reports having lost 20 pounds of weight.  He reports compliance with his medications with no perceived side effects.  The following portions of the patient's history were reviewed and updated as appropriate: allergies, current medications, past family history, past medical history, past social history, past surgical history and problem  Review of Systems   Constitutional: Negative for chills, diaphoresis, fever, malaise/fatigue, weight gain and weight loss.   HENT: Negative for ear discharge, hearing loss, hoarse voice and nosebleeds.    Eyes: Negative for discharge, double vision, pain and photophobia.   Cardiovascular: Negative for chest pain, claudication, cyanosis, dyspnea on exertion, irregular heartbeat, leg swelling, near-syncope, orthopnea, palpitations, paroxysmal nocturnal dyspnea and syncope.   Respiratory: Negative for cough, hemoptysis, shortness of breath, sputum production and wheezing.    Endocrine: Negative for cold intolerance, heat intolerance, polydipsia, polyphagia and polyuria.   Hematologic/Lymphatic: Negative for adenopathy and bleeding problem. Does not bruise/bleed easily.   Skin: Negative for color change, flushing, itching and rash.    Musculoskeletal: Negative for muscle cramps, muscle weakness, myalgias and stiffness.   Gastrointestinal: Negative for abdominal pain, diarrhea, hematemesis, hematochezia, nausea and vomiting.   Genitourinary: Negative for dysuria, frequency and nocturia.   Neurological: Negative for focal weakness, loss of balance, numbness, paresthesias and seizures.   Psychiatric/Behavioral: Negative for altered mental status, hallucinations and suicidal ideas.   Allergic/Immunologic: Negative for HIV exposure, hives and persistent infections.           Current Outpatient Medications:   •  allopurinol (ZYLOPRIM) 300 MG tablet, Take 300 mg by mouth Daily., Disp: , Rfl:   •  apixaban (ELIQUIS) 5 MG tablet tablet, Take 5 mg by mouth 2 (Two) Times a Day., Disp: , Rfl:   •  ASPIRIN LOW DOSE 81 MG EC tablet, Take 81 mg by mouth Daily., Disp: , Rfl:   •  atenolol (TENORMIN) 25 MG tablet, Take 25 mg by mouth Daily., Disp: , Rfl:   •  atorvastatin (LIPITOR) 40 MG tablet, Take 40 mg by mouth Daily., Disp: , Rfl:   •  levothyroxine (SYNTHROID, LEVOTHROID) 50 MCG tablet, Take 50 mcg by mouth Daily., Disp: , Rfl:   •  lisinopril (PRINIVIL,ZESTRIL) 20 MG tablet, Take 20 mg by mouth Daily., Disp: , Rfl:   •  torsemide (DEMADEX) 20 MG tablet, Take 3 tablets by mouth Daily, Disp: 90 tablet, Rfl: 0    Objective:   Vitals and nursing note reviewed.   Constitutional:       Appearance: Healthy appearance. Not in distress.   Neck:      Vascular: No JVR. JVD normal.   Pulmonary:      Effort: Pulmonary effort is normal.      Breath sounds: Normal breath sounds. No wheezing. No rhonchi. No rales.   Chest:      Chest wall: Not tender to palpatation.   Cardiovascular:      PMI at left midclavicular line. Normal rate. Regular rhythm. Normal S1. Normal S2.      Murmurs: There is no murmur.      No gallop. No click. No rub.   Pulses:     Intact distal pulses.   Edema:     Peripheral edema absent.   Abdominal:      General: Bowel sounds are normal.       "Palpations: Abdomen is soft.      Tenderness: There is no abdominal tenderness.   Musculoskeletal: Normal range of motion.         General: No tenderness. Skin:     General: Skin is warm and dry.   Neurological:      General: No focal deficit present.      Mental Status: Alert and oriented to person, place and time.       Blood pressure 120/60, pulse 105, height 182.9 cm (72\"), weight 128 kg (282 lb), SpO2 97 %.   Lab Review:     Assessment:       1. Chronic diastolic congestive heart failure (HCC)  Heart failure with preserved ejection fraction.  Stage C.  New York Heart Association functional class I symptoms.  Euvolemic.    2. Class 2 obesity, serious comorbidity, and body mass index (BMI) of 39.0 to 39.9 in adult (HCC)  This is due to excess calorie intake.    Procedures    Plan:     Advance Care Planning   ACP discussion was held with the patient during this visit. Patient has an advance directive in EMR which is still valid.      The patient has been counseled regarding the essential need to maintain abstinence from alcohol consumption.  He has been advised that very in particular is particularly notorious for worsening blood pressure and/ or congestive heart failure control due to its high sodium content.    He has been congratulated on his weight loss and encouraged to continue these efforts.    No changes in medication have been made at today's visit.    No further cardiovascular testing is warranted at this time.      "

## 2023-04-13 ENCOUNTER — OFFICE VISIT (OUTPATIENT)
Dept: GASTROENTEROLOGY | Facility: CLINIC | Age: 68
End: 2023-04-13
Payer: MEDICARE

## 2023-04-13 VITALS
SYSTOLIC BLOOD PRESSURE: 142 MMHG | HEART RATE: 82 BPM | OXYGEN SATURATION: 95 % | BODY MASS INDEX: 38.25 KG/M2 | WEIGHT: 282 LBS | DIASTOLIC BLOOD PRESSURE: 70 MMHG

## 2023-04-13 DIAGNOSIS — K76.0 FATTY LIVER: ICD-10-CM

## 2023-04-13 DIAGNOSIS — Z86.010 HISTORY OF ADENOMATOUS POLYP OF COLON: Primary | ICD-10-CM

## 2023-04-13 DIAGNOSIS — E66.9 CLASS 2 OBESITY WITH BODY MASS INDEX (BMI) OF 38.0 TO 38.9 IN ADULT, UNSPECIFIED OBESITY TYPE, UNSPECIFIED WHETHER SERIOUS COMORBIDITY PRESENT: ICD-10-CM

## 2023-04-13 PROCEDURE — 1159F MED LIST DOCD IN RCRD: CPT | Performed by: PHYSICIAN ASSISTANT

## 2023-04-13 PROCEDURE — 1160F RVW MEDS BY RX/DR IN RCRD: CPT | Performed by: PHYSICIAN ASSISTANT

## 2023-04-13 PROCEDURE — 99214 OFFICE O/P EST MOD 30 MIN: CPT | Performed by: PHYSICIAN ASSISTANT

## 2023-04-13 RX ORDER — CYCLOSPORINE 0.5 MG/ML
EMULSION OPHTHALMIC
COMMUNITY
Start: 2023-03-07

## 2023-04-13 RX ORDER — SODIUM CHLORIDE 9 MG/ML
30 INJECTION, SOLUTION INTRAVENOUS CONTINUOUS PRN
OUTPATIENT
Start: 2023-04-13

## 2023-04-13 RX ORDER — TRIAMCINOLONE ACETONIDE 1 MG/G
CREAM TOPICAL
COMMUNITY
Start: 2023-03-07

## 2023-04-13 RX ORDER — LIFITEGRAST 50 MG/ML
1 SOLUTION/ DROPS OPHTHALMIC 2 TIMES DAILY
COMMUNITY

## 2023-04-13 RX ORDER — BISACODYL 5 MG/1
TABLET, DELAYED RELEASE ORAL
Qty: 4 TABLET | Refills: 0 | Status: SHIPPED | OUTPATIENT
Start: 2023-04-13

## 2023-04-13 NOTE — PROGRESS NOTES
Follow Up Note     Date: 2023   Patient Name: Damion Todd Jr.  MRN: [id number]  : 1955     Primary Care Provider: Yasir Dougherty MD     Chief Complaint   Patient presents with   • Colon Cancer Screening     History of present illness:   2023  Damion Todd Jr. is a 67 y.o. male who is here today for follow up regarding Colon Cancer Screening.    He is back to the office now to reschedule previously arranged colonoscopy. After last visit, he was placed on Eliquis by cardiology. He is feeling well today, no rectal bleeding. No abdominal pain. Denies dysphagia. He has regular daily stools. He has been exercising regularly and following the mediterranean diet. He lost approx 30 lbs initially but has gained some of the weight back recently. He does admit a prior history of daily alcohol use. He never drank heavily consistently but did drink on a regular basis until the past several years, now drinks occasionally.     Interval History:  2022  His last colonoscopy was with Dr. Underwood in 2017.  He has a personal history of colon polyps.  He was asked to repeat colonoscopy in 5 years and would like to schedule now.  He has known hemorrhoids and reports a small amount of bright red rectal bleeding on the tissue intermittently.  No rectal pain.  Denies any acid reflux symptoms or dysphagia.  He has no history of fatty liver disease, drinks alcohol occasionally in small amounts.  He had recent labs with his PCP but is unsure of those results.  No current abdominal pain, no changes in bowel movements.  He has daily regular bowel movements, no constipation or diarrhea.    Subjective     Past Medical History:   Diagnosis Date   • Basal cell carcinoma    • CHF (congestive heart failure)    • Colon polyp    • DJD (degenerative joint disease), ankle and foot, left    • Elevated LFTs    • Gout    • Heart murmur    • Hyperlipidemia    • Hypertension    • JUAN (obstructive sleep apnea)    •  Sleep apnea     REPORTS HE TURNED CPAP IN, WAS UNABLE TO WEAR IT   • Snores      Past Surgical History:   Procedure Laterality Date   • COLONOSCOPY     • COLONOSCOPY N/A 2017    Procedure: COLONOSCOPY WITH HOT BIOPSY POLYPECTOMIES;  Surgeon: Rohith Underwood MD;  Location: [facility] ENDOSCOPY;  Service:    • FOOT SURGERY Left      Family History   Problem Relation Age of Onset   • Liver disease Mother    • Alcohol abuse Mother    • Cirrhosis Mother    • Colon cancer Paternal Uncle      Social History     Socioeconomic History   • Marital status:    Tobacco Use   • Smoking status: Former     Packs/day: 3.00     Years: 30.00     Pack years: 90.00     Types: Cigarettes     Quit date:      Years since quittin.2   • Smokeless tobacco: Never   Vaping Use   • Vaping Use: Never used   Substance and Sexual Activity   • Alcohol use: Yes     Alcohol/week: 6.0 standard drinks     Types: 6 Cans of beer per week     Comment: 6-7 beers per day   • Drug use: No   • Sexual activity: Defer       Current Outpatient Medications:   •  allopurinol (ZYLOPRIM) 300 MG tablet, Take 1 tablet by mouth Daily., Disp: , Rfl:   •  apixaban (ELIQUIS) 5 MG tablet tablet, Take 1 tablet by mouth 2 (Two) Times a Day., Disp: , Rfl:   •  ASPIRIN LOW DOSE 81 MG EC tablet, Take 1 tablet by mouth Daily., Disp: , Rfl:   •  atenolol (TENORMIN) 25 MG tablet, Take 1 tablet by mouth Daily., Disp: , Rfl:   •  atorvastatin (LIPITOR) 40 MG tablet, Take 1 tablet by mouth Daily., Disp: , Rfl:   •  levothyroxine (SYNTHROID, LEVOTHROID) 50 MCG tablet, Take 1 tablet by mouth Daily., Disp: , Rfl:   •  Lifitegrast (Xiidra) 5 % ophthalmic solution, Administer 1 drop to both eyes 2 (Two) Times a Day., Disp: , Rfl:   •  lisinopril (PRINIVIL,ZESTRIL) 20 MG tablet, Take 1 tablet by mouth Daily., Disp: , Rfl:   •  Restasis 0.05 % ophthalmic emulsion, INSTILL ONE DROP IN EACH EYE TWICE DAILY, Disp: , Rfl:   •  triamcinolone (KENALOG) 0.1 % cream, APPLY A thin  layer affected area TWICE DAILY, Disp: , Rfl:   •  torsemide (DEMADEX) 20 MG tablet, Take 3 tablets by mouth Daily, Disp: 90 tablet, Rfl: 0    No Known Allergies    The following portions of the patient's history were reviewed and updated as appropriate: allergies, current medications, past family history, past medical history, past social history, past surgical history and problem list.    Objective     Physical Exam  Constitutional:       General: He is not in acute distress.     Appearance: Normal appearance. He is well-developed. He is not diaphoretic.   HENT:      Head: Normocephalic and atraumatic.      Right Ear: External ear normal.      Left Ear: External ear normal.      Nose: Nose normal.   Eyes:      General: No scleral icterus.        Right eye: No discharge.         Left eye: No discharge.      Conjunctiva/sclera: Conjunctivae normal.   Neck:      Trachea: No tracheal deviation.   Pulmonary:      Effort: Pulmonary effort is normal. No respiratory distress.   Musculoskeletal:         General: Normal range of motion.      Cervical back: Normal range of motion.   Skin:     Coloration: Skin is not pale.      Findings: No erythema or rash.   Neurological:      Mental Status: He is alert and oriented to person, place, and time.      Coordination: Coordination normal.   Psychiatric:         Mood and Affect: Mood normal.         Behavior: Behavior normal.         Thought Content: Thought content normal.         Judgment: Judgment normal.       Vitals:    04/13/23 0913   BP: 142/70   Pulse: 82   SpO2: 95%   Weight: 128 kg (282 lb)     Results Review:   I reviewed the patient's new clinical results.    Admission on 07/18/2022, Discharged on 07/20/2022   Component Date Value Ref Range Status   • Glucose 07/18/2022 110 (H)  65 - 99 mg/dL Final   • BUN 07/18/2022 21  8 - 23 mg/dL Final   • Creatinine 07/18/2022 1.11  0.76 - 1.27 mg/dL Final   • Sodium 07/18/2022 135 (L)  136 - 145 mmol/L Final   • Potassium  07/18/2022 4.4  3.5 - 5.2 mmol/L Final   • Chloride 07/18/2022 105  98 - 107 mmol/L Final   • CO2 07/18/2022 16.7 (L)  22.0 - 29.0 mmol/L Final   • Calcium 07/18/2022 9.3  8.6 - 10.5 mg/dL Final   • Total Protein 07/18/2022 6.7  6.0 - 8.5 g/dL Final   • Albumin 07/18/2022 3.70  3.50 - 5.20 g/dL Final   • ALT (SGPT) 07/18/2022 24  1 - 41 U/L Final   • AST (SGOT) 07/18/2022 17  1 - 40 U/L Final   • Alkaline Phosphatase 07/18/2022 87  39 - 117 U/L Final   • Total Bilirubin 07/18/2022 0.7  0.0 - 1.2 mg/dL Final   • Globulin 07/18/2022 3.0  gm/dL Final   • A/G Ratio 07/18/2022 1.2  g/dL Final   • BUN/Creatinine Ratio 07/18/2022 18.9  7.0 - 25.0 Final   • Anion Gap 07/18/2022 13.3  5.0 - 15.0 mmol/L Final   • eGFR 07/18/2022 73.7  >60.0 mL/min/1.73 Final    National Kidney Foundation and American Society of Nephrology (ASN) Task Force recommended calculation based on the Chronic Kidney Disease Epidemiology Collaboration (CKD-EPI) equation refit without adjustment for race.   • proBNP 07/18/2022 6,091.0 (H)  0.0 - 900.0 pg/mL Final   • Troponin T 07/18/2022 0.016  0.000 - 0.030 ng/mL Final   • WBC 07/18/2022 14.93 (H)  3.40 - 10.80 10*3/mm3 Final   • RBC 07/18/2022 4.06 (L)  4.14 - 5.80 10*6/mm3 Final   • Hemoglobin 07/18/2022 12.6 (L)  13.0 - 17.7 g/dL Final   • Hematocrit 07/18/2022 36.5 (L)  37.5 - 51.0 % Final   • MCV 07/18/2022 89.9  79.0 - 97.0 fL Final   • MCH 07/18/2022 31.0  26.6 - 33.0 pg Final   • MCHC 07/18/2022 34.5  31.5 - 35.7 g/dL Final   • RDW 07/18/2022 13.2  12.3 - 15.4 % Final   • RDW-SD 07/18/2022 43.9  37.0 - 54.0 fl Final   • MPV 07/18/2022 10.0  6.0 - 12.0 fL Final   • Platelets 07/18/2022 278  140 - 450 10*3/mm3 Final   • Glucose 07/19/2022 102 (H)  65 - 99 mg/dL Final   • BUN 07/19/2022 22  8 - 23 mg/dL Final   • Creatinine 07/19/2022 0.96  0.76 - 1.27 mg/dL Final   • Sodium 07/19/2022 137  136 - 145 mmol/L Final   • Potassium 07/19/2022 3.8  3.5 - 5.2 mmol/L Final   • Chloride 07/19/2022 103  98  - 107 mmol/L Final   • CO2 07/19/2022 19.0 (L)  22.0 - 29.0 mmol/L Final   • Calcium 07/19/2022 8.9  8.6 - 10.5 mg/dL Final   • BUN/Creatinine Ratio 07/19/2022 22.9  7.0 - 25.0 Final   • Anion Gap 07/19/2022 15.0  5.0 - 15.0 mmol/L Final   • eGFR 07/19/2022 87.7  >60.0 mL/min/1.73 Final    National Kidney Foundation and American Society of Nephrology (ASN) Task Force recommended calculation based on the Chronic Kidney Disease Epidemiology Collaboration (CKD-EPI) equation refit without adjustment for race.   • Neutrophil % 07/19/2022 82.0 (H)  42.7 - 76.0 % Final   • Lymphocyte % 07/19/2022 12.0 (L)  19.6 - 45.3 % Final   • Monocyte % 07/19/2022 5.0  5.0 - 12.0 % Final   • Basophil % 07/19/2022 1.0  0.0 - 1.5 % Final   • Neutrophils Absolute 07/19/2022 9.77 (H)  1.70 - 7.00 10*3/mm3 Final   • Lymphocytes Absolute 07/19/2022 1.43  0.70 - 3.10 10*3/mm3 Final   • Monocytes Absolute 07/19/2022 0.60  0.10 - 0.90 10*3/mm3 Final   • Basophils Absolute 07/19/2022 0.12  0.00 - 0.20 10*3/mm3 Final   • RBC Morphology 07/19/2022 Normal  Normal Final   • WBC Morphology 07/19/2022 Normal  Normal Final   • Platelet Morphology 07/19/2022 Normal  Normal Final   • WBC 07/19/2022 11.92 (H)  3.40 - 10.80 10*3/mm3 Final   • RBC 07/19/2022 3.88 (L)  4.14 - 5.80 10*6/mm3 Final   • Hemoglobin 07/19/2022 12.0 (L)  13.0 - 17.7 g/dL Final   • Hematocrit 07/19/2022 35.1 (L)  37.5 - 51.0 % Final   • MCV 07/19/2022 90.5  79.0 - 97.0 fL Final   • MCH 07/19/2022 30.9  26.6 - 33.0 pg Final   • MCHC 07/19/2022 34.2  31.5 - 35.7 g/dL Final   • RDW 07/19/2022 13.3  12.3 - 15.4 % Final   • RDW-SD 07/19/2022 43.6  37.0 - 54.0 fl Final   • MPV 07/19/2022 10.8  6.0 - 12.0 fL Final   • Platelets 07/19/2022 263  140 - 450 10*3/mm3 Final   • Free T4 07/19/2022 1.29  0.93 - 1.70 ng/dL Final   • Glucose 07/20/2022 100 (H)  65 - 99 mg/dL Final   • BUN 07/20/2022 30 (H)  8 - 23 mg/dL Final   • Creatinine 07/20/2022 1.05  0.76 - 1.27 mg/dL Final   • Sodium  07/20/2022 138  136 - 145 mmol/L Final   • Potassium 07/20/2022 4.0  3.5 - 5.2 mmol/L Final   • Chloride 07/20/2022 104  98 - 107 mmol/L Final   • CO2 07/20/2022 20.6 (L)  22.0 - 29.0 mmol/L Final   • Calcium 07/20/2022 8.9  8.6 - 10.5 mg/dL Final   • BUN/Creatinine Ratio 07/20/2022 28.6 (H)  7.0 - 25.0 Final   • Anion Gap 07/20/2022 13.4  5.0 - 15.0 mmol/L Final   • eGFR 07/20/2022 78.8  >60.0 mL/min/1.73 Final    National Kidney Foundation and American Society of Nephrology (ASN) Task Force recommended calculation based on the Chronic Kidney Disease Epidemiology Collaboration (CKD-EPI) equation refit without adjustment for race.      Labs 5/2/2022: TSH 0.009, , Total cholesterol 193, , Hgb 15.3, MCV 90, platelets 226,000, WBC 8.3, albumin 4.5, alk phos 96, ALT 55, AST 43, bili 0.4, Cr 0.65, , glucose 93, Na 137.    Colonoscopy with Dr. Underwood 6/20/2017:  Scant diverticular change of the colon, colon polyps, internal hemorrhoids.  Pathology showed ascending colon polyp to be hyperplastic polyp, sigmoid polyp was tubular adenoma, hepatic flexure polyp was tubular adenoma, rectal polyps were hyperplastic.     US abd 6/2017  FINDINGS: .  The visualized pancreas is unremarkable. The liver is echogenic  consistent with fatty infiltration. No focal liver lesions are  identified. The gallbladder Is unremarkable with no shadowing stones or  wall thickening.  The common duct measures 3 mm . The right kidney  measures  11.5 cm in length and is normal in echogenicity without  hydronephrosis. The left kidney measures  11.8 cm in length and is  normal in echogenicity without hydronephrosis.  Spleen is unremarkable.   The aorta is normal in caliber. The vena cava is unremarkable.  IMPRESSION:  Fatty infiltration of the liver.       Assessment / Plan      1. History of adenomatous polyp of colon  His last colonoscopy was in 2017 by Dr. Underwood, he had tubular adenomatous polyps removed at that time.  He was  directed to repeat colonoscopy in 5 years, will schedule now. he had been placed on Eliquis after last visit and colonoscopy was delayed. He has been on ASA plus Eliquis now for greater than 6 months.      He will have a colonoscopy performed with monitored anesthesia care. The indications, technique, alternatives and potential risk and complications were discussed with the patient including but not limited to bleeding, bowel perforations, missing lesions and anesthetic complications. The patient understands and wishes to proceed with the procedure and has given their verbal consent. Written patient education information was given to the patient. He should follow up in the office after this procedure to discuss the results and further recommendations can be made at that time. The patient will call if they have further questions before procedure.  - Case Request  - polyethylene glycol (GoLYTELY) 236 g solution; Follow instructions given at office  Dispense: 4000 mL; Refill: 0  - bisacodyl (Dulcolax) 5 MG EC tablet; Follow instructions given at office  Dispense: 4 tablet; Refill: 0    2. Fatty liver, suspect both alcoholic and nonalcoholic  3. Class 2 obesity with body mass index (BMI) of 38.0 to 38.9 in adult, unspecified obesity type, unspecified whether serious comorbidity present  BMI is now 38, improved from 40.  He has a known history of fatty liver disease, last seen in 2017 on ultrasound of the abdomen. He does take atorvastatin for treatment of elevated cholesterol. No history of diabetes mellitus. Patient has been working on diet modification and exercise for weight loss and had lost approx 30 lbs, now gained some weight back. Labs 2022 show normal liver enzymes, normal platelets. He does admit a previous history of alcoholism, still drinks small amount of beer occasionally but drank daily for most of his life up until the past several years.     Continue Mediterranean diet  Avoid alcohol  Continue to work  on weight loss      Follow Up:   Return for follow up after procedure to discuss results.    Gemma Mcdaniels PA-C  [facility]  4/13/2023  10:11 EDT    Dictated Utilizing Dragon Dictation: Part of this note may be an electronic transcription/translation of spoken language to printed text using the Dragon Dictation System.

## 2024-02-28 ENCOUNTER — TRANSCRIBE ORDERS (OUTPATIENT)
Dept: GENERAL RADIOLOGY | Facility: HOSPITAL | Age: 69
End: 2024-02-28
Payer: MEDICARE

## 2024-02-28 ENCOUNTER — HOSPITAL ENCOUNTER (OUTPATIENT)
Dept: GENERAL RADIOLOGY | Facility: HOSPITAL | Age: 69
Discharge: HOME | End: 2024-02-28
Payer: MEDICARE

## 2024-02-28 DIAGNOSIS — M79.672 LEFT FOOT PAIN: ICD-10-CM

## 2024-02-28 DIAGNOSIS — M79.672 LEFT FOOT PAIN: Primary | ICD-10-CM

## 2024-02-28 PROCEDURE — 73630 X-RAY EXAM OF FOOT: CPT

## 2024-03-05 ENCOUNTER — APPOINTMENT (OUTPATIENT)
Dept: GENERAL RADIOLOGY | Facility: HOSPITAL | Age: 69
End: 2024-03-05
Payer: MEDICARE

## 2024-03-05 ENCOUNTER — HOSPITAL ENCOUNTER (EMERGENCY)
Facility: HOSPITAL | Age: 69
Discharge: HOME | End: 2024-03-05
Attending: EMERGENCY MEDICINE | Admitting: EMERGENCY MEDICINE
Payer: MEDICARE

## 2024-03-05 VITALS
DIASTOLIC BLOOD PRESSURE: 77 MMHG | HEIGHT: 72 IN | BODY MASS INDEX: 37.93 KG/M2 | HEART RATE: 110 BPM | RESPIRATION RATE: 18 BRPM | OXYGEN SATURATION: 96 % | TEMPERATURE: 97.6 F | SYSTOLIC BLOOD PRESSURE: 173 MMHG | WEIGHT: 280 LBS
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
## 2024-03-05 DIAGNOSIS — S50.312A ABRASION OF LEFT ELBOW, INITIAL ENCOUNTER: ICD-10-CM

## 2024-03-05 DIAGNOSIS — S80.12XA HEMATOMA OF LEFT LOWER EXTREMITY, INITIAL ENCOUNTER: ICD-10-CM

## 2024-03-05 PROCEDURE — 73080 X-RAY EXAM OF ELBOW: CPT

## 2024-03-05 PROCEDURE — 99283 EMERGENCY DEPT VISIT LOW MDM: CPT

## 2024-03-05 PROCEDURE — 73552 X-RAY EXAM OF FEMUR 2/>: CPT

## 2024-03-05 PROCEDURE — 73562 X-RAY EXAM OF KNEE 3: CPT

## 2024-03-05 PROCEDURE — 73502 X-RAY EXAM HIP UNI 2-3 VIEWS: CPT

## 2024-03-05 RX ORDER — LIDOCAINE 50 MG/G
1 PATCH TOPICAL DAILY PRN
Qty: 30 PATCH | Refills: 0 | Status: SHIPPED | OUTPATIENT
Start: 2024-03-05

## 2024-03-05 RX ORDER — METHOCARBAMOL 500 MG/1
500 TABLET, FILM COATED ORAL 3 TIMES DAILY PRN
Qty: 30 TABLET | Refills: 0 | Status: SHIPPED | OUTPATIENT
Start: 2024-03-05

## 2024-03-05 RX ORDER — HYDROCODONE BITARTRATE AND ACETAMINOPHEN 5; 325 MG/1; MG/1
1 TABLET ORAL ONCE
Status: COMPLETED | OUTPATIENT
Start: 2024-03-05 | End: 2024-03-05

## 2024-03-05 RX ADMIN — HYDROCODONE BITARTRATE AND ACETAMINOPHEN 1 TABLET: 5; 325 TABLET ORAL at 09:19

## 2024-03-05 NOTE — ED PROVIDER NOTES
Subjective  History of Present Illness:    This is a 68-year-old male, history of congestive heart failure, degenerative joint disease, presenting today for evaluation of a leg injury.  Patient was fishing yesterday when he had 1 foot on the dock and the boat started to slip away causing him to fall forward with his left thigh and to the railing of the boat.  Patient adamantly denies that he hit his head there is no loss conscious.  No neck or back pain.  He is on anticoagulation with Eliquis.  He reports a knot on the outside of his left leg and has had painful weightbearing since yesterday.  He reports he is still able to bear weight and walk and ambulate but with extreme difficulty.  Neurovascular intact with pulses identified in the lower extremities on arrival.      Nurses Notes reviewed and agree, including vitals, allergies, social history and prior medical history.     REVIEW OF SYSTEMS: All systems reviewed and not pertinent unless noted.  Review of Systems   Musculoskeletal:  Negative for back pain and neck pain.   Neurological:  Negative for headaches.   All other systems reviewed and are negative.      Past Medical History:   Diagnosis Date    Basal cell carcinoma     CHF (congestive heart failure)     Colon polyp 2012    Disease of thyroid gland     DJD (degenerative joint disease), ankle and foot, left     Elevated cholesterol     Elevated LFTs     Gout     Heart murmur     Hyperlipidemia     Hypertension     JUAN (obstructive sleep apnea)     Sleep apnea     REPORTS HE TURNED CPAP IN, WAS UNABLE TO WEAR IT    Snores        Allergies:    Patient has no known allergies.      Past Surgical History:   Procedure Laterality Date    COLONOSCOPY  2012    COLONOSCOPY N/A 06/20/2017    Procedure: COLONOSCOPY WITH HOT BIOPSY POLYPECTOMIES;  Surgeon: Rohith Underwood MD;  Location: [facility] ENDOSCOPY;  Service:     COLONOSCOPY      COLONOSCOPY N/A 6/21/2023    Procedure: COLONOSCOPY FOR SCREENING WITH BIOPSY;   "Surgeon: Anne Mccrary MD;  Location: [facility] ENDOSCOPY;  Service: Gastroenterology;  Laterality: N/A;    FOOT SURGERY Left          Social History     Socioeconomic History    Marital status:    Tobacco Use    Smoking status: Former     Current packs/day: 0.00     Average packs/day: 3.0 packs/day for 30.0 years (90.0 ttl pk-yrs)     Types: Cigarettes     Start date:      Quit date:      Years since quittin.1    Smokeless tobacco: Never   Vaping Use    Vaping status: Never Used   Substance and Sexual Activity    Alcohol use: Not Currently     Alcohol/week: 6.0 standard drinks of alcohol     Types: 6 Cans of beer per week     Comment: 6-7 beers per day- no ETOH in the last three weeks per report 23    Drug use: No    Sexual activity: Defer         Family History   Problem Relation Age of Onset    Liver disease Mother     Alcohol abuse Mother     Cirrhosis Mother     Colon cancer Paternal Uncle        Objective  Physical Exam:  /77 (BP Location: Left arm, Patient Position: Sitting)   Pulse 110   Temp 97.6 °F (36.4 °C) (Oral)   Resp 18   Ht 182.9 cm (72\")   Wt 127 kg (280 lb)   SpO2 96%   BMI 37.97 kg/m²      Physical Exam  Vitals and nursing note reviewed.   Constitutional:       General: He is not in acute distress.     Appearance: He is obese. He is not ill-appearing, toxic-appearing or diaphoretic.   HENT:      Head: Normocephalic and atraumatic.      Nose: Nose normal.      Mouth/Throat:      Pharynx: Oropharynx is clear.   Eyes:      Extraocular Movements: Extraocular movements intact.   Cardiovascular:      Pulses: Normal pulses.      Comments: Appears well-perfused  Pulmonary:      Effort: Pulmonary effort is normal. No respiratory distress.   Abdominal:      General: Abdomen is flat.   Musculoskeletal:         General: Swelling and tenderness present. No deformity. Normal range of motion.      Cervical back: Normal range of motion.      Comments: There is a " probable hematoma just above the lateral left knee on the lower thigh.  No associated bruising.  Patient has full range of motion although his pain is exacerbated by range of motion.  There are no obvious deformities.  2+ pedal pulses bilaterally.  No tenderness of the hip, patient does have tenderness of the mid and lower femur.  No proximal fibular tenderness.  No cervical thoracic or lumbar spine tenderness, head atraumatic.  Abrasions noted and pain to palpation of the left elbow but full range of motion and neurovascular intact with 2+ radial pulses identified no obvious deformities.   Skin:     General: Skin is warm and dry.      Capillary Refill: Capillary refill takes less than 2 seconds.   Neurological:      General: No focal deficit present.      Mental Status: He is alert and oriented to person, place, and time.   Psychiatric:         Mood and Affect: Mood normal.         Behavior: Behavior normal.         Thought Content: Thought content normal.         Judgment: Judgment normal.             Procedures    ED Course:         Lab Results (last 24 hours)       ** No results found for the last 24 hours. **             XR Hip With or Without Pelvis 2 - 3 View Left    Result Date: 3/5/2024  CLINICAL INDICATION:  fall pain rule out fx; W19.XXXA-Unspecified fall, initial encounter; S80.12XA-Contusion of left lower leg, initial encounter  EXAMINATION TECHNIQUE: XR HIP W OR WO PELVIS 2-3 VIEW LEFT-  COMPARISON: None.  FINDINGS: No acute fracture or malalignment. Left hip joint is well approximated. Mild left hip joint degenerative changes. Vascular calcifications. Imaged portions of the right hip joint bilateral SI joints and pubic symphysis are intact with the degenerative changes. Pelvic phleboliths.      Impression: No acute osseous findings.  Images personally reviewed, interpreted and dictated by BHARATH Cortes.     This report was signed and finalized on 3/5/2024 9:44 AM by BHARATH Cortes.       XR Femur 2 View Left    Result Date: 3/5/2024  CLINICAL INDICATION:  fall, pain rule out fx; W19.XXXA-Unspecified fall, initial encounter; S80.12XA-Contusion of left lower leg, initial encounter  EXAMINATION TECHNIQUE: XR FEMUR 2 VW LEFT-  COMPARISON: None.  FINDINGS: No acute fracture or malalignment. Mild left hip joint degenerative changes. Moderate left knee joint degenerative changes. Vascular calcifications. No acute soft tissue abnormality. No radiodense foreign bodies.      Impression: No acute osseous findings.  Images personally reviewed, interpreted and dictated by BHARATH Cortes.     This report was signed and finalized on 3/5/2024 9:43 AM by BHARATH Cortes.      XR Knee 3 View Left    Result Date: 3/5/2024  CLINICAL INDICATION:  fall pain rule out fx; W19.XXXA-Unspecified fall, initial encounter; S80.12XA-Contusion of left lower leg, initial encounter  EXAMINATION TECHNIQUE: XR KNEE 3 VW LEFT-  COMPARISON: None.  FINDINGS: No acute fracture or malalignment. No elbow joint effusion. Sequela of prior lateral tibial, proximal tibial, and proximal fibular healed fracture deformity. Moderate tricompartmental osteoarthrosis. Vascular calcifications. No acute soft tissue abnormality. No radiodense foreign bodies.      Impression: No acute osseous findings. Prior posttraumatic/degenerative changes.  Images personally reviewed, interpreted and dictated by BHARATH Cortes.     This report was signed and finalized on 3/5/2024 9:38 AM by BHARATH Cortes.      XR Elbow 3+ View Left    Result Date: 3/5/2024  CLINICAL INDICATION:  fall pain abrasions rule out fx; W19.XXXA-Unspecified fall, initial encounter; S80.12XA-Contusion of left lower leg, initial encounter  EXAMINATION TECHNIQUE: XR ELBOW 3+ VW LEFT-  COMPARISON: None.  FINDINGS: No acute fracture or malalignment. No elbow joint effusion. Mild elbow joint degenerative changes. Sequela of prior avulsion injury along the common  extensor and common flexor tendon origins.      Impression: No acute osseous findings. Degenerative changes.  Images personally reviewed, interpreted and dictated by BHARATH Cortes.     This report was signed and finalized on 3/5/2024 9:37 AM by BHARATH Cortes.          [facility]      Initial impression of presenting illness: This is a 68-year-old male presenting today for evaluation of leg injury after a fall yesterday    DDX: includes but is not limited to: Hematoma, fracture, sprain, strain, dislocation, contusion, others    Patient arrives hemodynamically stable afebrile tachycardic at a rate of 110 nonhypoxic nontoxic-appearing with vitals interpreted by myself.     Pertinent features from physical exam: There is a probable hematoma just above the lateral left knee on the lower thigh.  No associated bruising.  Patient has full range of motion although his pain is exacerbated by range of motion.  There are no obvious deformities.  2+ pedal pulses bilaterally.  No tenderness of the hip, patient does have tenderness of the mid and lower femur.  No proximal fibular tenderness.  No cervical thoracic or lumbar spine tenderness, head atraumatic.  Abrasions noted and pain to palpation of the left elbow but full range of motion and neurovascular intact with 2+ radial pulses identified no obvious deformities..  Hematoma is small in nature of the left lower extremity. Compartments soft.     Initial diagnostic plan: Plain film of the left elbow left femur left knee left hip    Results from initial plan were reviewed and interpreted by me revealing plain film of the left knee per radiology interpretation prior posttraumatic changes and degenerative changes and I personally evaluated the plain film and concur, plain film of the left elbow no acute osseous findings per radiology.  Plain film of the left femur and left hip also without acute osseous abnormality per radiology.  I personally evaluated each of these plain  films and concur with radiology interpretation.    Diagnostic information from other sources: Old record reviewed    Interventions / Re-evaluation: Norco, ice, compression bandage.  Stable for discharge.    Results/clinical rationale were discussed with patient at bedside    Consultations/Discussion of results with other physicians: N/A    Disposition plan: Discharge.  Follow-up with primary care and orthopedics.  Return precautions given.  Supportive care discussed.  Agreeable at bedside.  -----    Final diagnoses:   Fall, initial encounter   Hematoma of left lower extremity, initial encounter   Abrasion of left elbow, initial encounter          Issa Cummings PA-C  03/05/24 [id number]

## 2024-03-05 NOTE — DISCHARGE INSTRUCTIONS
Return for worsening symptoms.  Follow close with your primary care.  Recommend ice and compression bandage to this area to help with swelling.  Should this area continue to expand and get bigger, please return for reevaluation.

## 2024-03-19 ENCOUNTER — APPOINTMENT (OUTPATIENT)
Dept: CT IMAGING | Facility: HOSPITAL | Age: 69
End: 2024-03-19
Payer: MEDICARE

## 2024-03-19 ENCOUNTER — APPOINTMENT (OUTPATIENT)
Dept: ULTRASOUND IMAGING | Facility: HOSPITAL | Age: 69
End: 2024-03-19
Payer: MEDICARE

## 2024-03-19 ENCOUNTER — APPOINTMENT (OUTPATIENT)
Dept: GENERAL RADIOLOGY | Facility: HOSPITAL | Age: 69
End: 2024-03-19
Payer: MEDICARE

## 2024-03-19 ENCOUNTER — HOSPITAL ENCOUNTER (EMERGENCY)
Facility: HOSPITAL | Age: 69
Discharge: HOME | End: 2024-03-19
Attending: STUDENT IN AN ORGANIZED HEALTH CARE EDUCATION/TRAINING PROGRAM | Admitting: STUDENT IN AN ORGANIZED HEALTH CARE EDUCATION/TRAINING PROGRAM
Payer: MEDICARE

## 2024-03-19 VITALS
WEIGHT: 285 LBS | SYSTOLIC BLOOD PRESSURE: 121 MMHG | OXYGEN SATURATION: 95 % | RESPIRATION RATE: 19 BRPM | DIASTOLIC BLOOD PRESSURE: 66 MMHG | BODY MASS INDEX: 38.6 KG/M2 | HEIGHT: 72 IN | TEMPERATURE: 97.2 F | HEART RATE: 92 BPM

## 2024-03-19 DIAGNOSIS — I50.33 ACUTE ON CHRONIC DIASTOLIC CONGESTIVE HEART FAILURE: Primary | ICD-10-CM

## 2024-03-19 LAB
ALBUMIN SERPL-MCNC: 3.8 G/DL (ref 3.5–5.2)
ALBUMIN/GLOB SERPL: 1.2 G/DL
ALP SERPL-CCNC: 95 U/L (ref 39–117)
ALT SERPL W P-5'-P-CCNC: 103 U/L (ref 1–41)
ANION GAP SERPL CALCULATED.3IONS-SCNC: 15.1 MMOL/L (ref 5–15)
AST SERPL-CCNC: 93 U/L (ref 1–40)
BASOPHILS # BLD AUTO: 0.03 10*3/MM3 (ref 0–0.2)
BASOPHILS NFR BLD AUTO: 0.4 % (ref 0–1.5)
BILIRUB SERPL-MCNC: 0.6 MG/DL (ref 0–1.2)
BUN SERPL-MCNC: 19 MG/DL (ref 8–23)
BUN/CREAT SERPL: 23.2 (ref 7–25)
CALCIUM SPEC-SCNC: 8.8 MG/DL (ref 8.6–10.5)
CHLORIDE SERPL-SCNC: 100 MMOL/L (ref 98–107)
CO2 SERPL-SCNC: 21.9 MMOL/L (ref 22–29)
CREAT SERPL-MCNC: 0.82 MG/DL (ref 0.76–1.27)
CRP SERPL-MCNC: 3.82 MG/DL (ref 0–0.5)
D DIMER PPP FEU-MCNC: 1.07 MCGFEU/ML (ref 0–0.67)
DEPRECATED RDW RBC AUTO: 43.4 FL (ref 37–54)
EGFRCR SERPLBLD CKD-EPI 2021: 96.3 ML/MIN/1.73
EOSINOPHIL # BLD AUTO: 0.09 10*3/MM3 (ref 0–0.4)
EOSINOPHIL NFR BLD AUTO: 1.2 % (ref 0.3–6.2)
ERYTHROCYTE [DISTWIDTH] IN BLOOD BY AUTOMATED COUNT: 13.8 % (ref 12.3–15.4)
FLUAV SUBTYP SPEC NAA+PROBE: NOT DETECTED
FLUBV RNA ISLT QL NAA+PROBE: NOT DETECTED
GEN 5 2HR TROPONIN T REFLEX: 40 NG/L
GLOBULIN UR ELPH-MCNC: 3.1 GM/DL
GLUCOSE SERPL-MCNC: 102 MG/DL (ref 65–99)
HCT VFR BLD AUTO: 36.8 % (ref 37.5–51)
HGB BLD-MCNC: 12.9 G/DL (ref 13–17.7)
HOLD SPECIMEN: NORMAL
HOLD SPECIMEN: NORMAL
IMM GRANULOCYTES # BLD AUTO: 0.04 10*3/MM3 (ref 0–0.05)
IMM GRANULOCYTES NFR BLD AUTO: 0.5 % (ref 0–0.5)
LYMPHOCYTES # BLD AUTO: 1.41 10*3/MM3 (ref 0.7–3.1)
LYMPHOCYTES NFR BLD AUTO: 18 % (ref 19.6–45.3)
MAGNESIUM SERPL-MCNC: 1.5 MG/DL (ref 1.6–2.4)
MCH RBC QN AUTO: 30.6 PG (ref 26.6–33)
MCHC RBC AUTO-ENTMCNC: 35.1 G/DL (ref 31.5–35.7)
MCV RBC AUTO: 87.2 FL (ref 79–97)
MONOCYTES # BLD AUTO: 0.52 10*3/MM3 (ref 0.1–0.9)
MONOCYTES NFR BLD AUTO: 6.6 % (ref 5–12)
NEUTROPHILS NFR BLD AUTO: 5.73 10*3/MM3 (ref 1.7–7)
NEUTROPHILS NFR BLD AUTO: 73.3 % (ref 42.7–76)
NRBC BLD AUTO-RTO: 0 /100 WBC (ref 0–0.2)
NT-PROBNP SERPL-MCNC: 2888 PG/ML (ref 0–900)
PLATELET # BLD AUTO: 375 10*3/MM3 (ref 140–450)
PMV BLD AUTO: 9.7 FL (ref 6–12)
POTASSIUM SERPL-SCNC: 3.9 MMOL/L (ref 3.5–5.2)
PROCALCITONIN SERPL-MCNC: 0.14 NG/ML (ref 0–0.25)
PROT SERPL-MCNC: 6.9 G/DL (ref 6–8.5)
RBC # BLD AUTO: 4.22 10*6/MM3 (ref 4.14–5.8)
SARS-COV-2 RNA RESP QL NAA+PROBE: NOT DETECTED
SODIUM SERPL-SCNC: 137 MMOL/L (ref 136–145)
TROPONIN T DELTA: -10 NG/L
TROPONIN T SERPL HS-MCNC: 50 NG/L
WBC NRBC COR # BLD AUTO: 7.82 10*3/MM3 (ref 3.4–10.8)
WHOLE BLOOD HOLD COAG: NORMAL
WHOLE BLOOD HOLD SPECIMEN: NORMAL

## 2024-03-19 PROCEDURE — 85025 COMPLETE CBC W/AUTO DIFF WBC: CPT

## 2024-03-19 PROCEDURE — 96375 TX/PRO/DX INJ NEW DRUG ADDON: CPT

## 2024-03-19 PROCEDURE — 96374 THER/PROPH/DIAG INJ IV PUSH: CPT

## 2024-03-19 PROCEDURE — 71275 CT ANGIOGRAPHY CHEST: CPT

## 2024-03-19 PROCEDURE — 25010000002 FUROSEMIDE PER 20 MG: Performed by: STUDENT IN AN ORGANIZED HEALTH CARE EDUCATION/TRAINING PROGRAM

## 2024-03-19 PROCEDURE — 25510000001 IOPAMIDOL 61 % SOLUTION: Performed by: STUDENT IN AN ORGANIZED HEALTH CARE EDUCATION/TRAINING PROGRAM

## 2024-03-19 PROCEDURE — 71045 X-RAY EXAM CHEST 1 VIEW: CPT

## 2024-03-19 PROCEDURE — 84145 PROCALCITONIN (PCT): CPT | Performed by: STUDENT IN AN ORGANIZED HEALTH CARE EDUCATION/TRAINING PROGRAM

## 2024-03-19 PROCEDURE — 83880 ASSAY OF NATRIURETIC PEPTIDE: CPT

## 2024-03-19 PROCEDURE — 84484 ASSAY OF TROPONIN QUANT: CPT | Performed by: STUDENT IN AN ORGANIZED HEALTH CARE EDUCATION/TRAINING PROGRAM

## 2024-03-19 PROCEDURE — 85379 FIBRIN DEGRADATION QUANT: CPT | Performed by: STUDENT IN AN ORGANIZED HEALTH CARE EDUCATION/TRAINING PROGRAM

## 2024-03-19 PROCEDURE — 86140 C-REACTIVE PROTEIN: CPT | Performed by: STUDENT IN AN ORGANIZED HEALTH CARE EDUCATION/TRAINING PROGRAM

## 2024-03-19 PROCEDURE — 93005 ELECTROCARDIOGRAM TRACING: CPT

## 2024-03-19 PROCEDURE — 99285 EMERGENCY DEPT VISIT HI MDM: CPT

## 2024-03-19 PROCEDURE — 25010000002 MAGNESIUM SULFATE 2 GM/50ML SOLUTION: Performed by: STUDENT IN AN ORGANIZED HEALTH CARE EDUCATION/TRAINING PROGRAM

## 2024-03-19 PROCEDURE — 93971 EXTREMITY STUDY: CPT

## 2024-03-19 PROCEDURE — 36415 COLL VENOUS BLD VENIPUNCTURE: CPT

## 2024-03-19 PROCEDURE — 83735 ASSAY OF MAGNESIUM: CPT | Performed by: STUDENT IN AN ORGANIZED HEALTH CARE EDUCATION/TRAINING PROGRAM

## 2024-03-19 PROCEDURE — 87636 SARSCOV2 & INF A&B AMP PRB: CPT | Performed by: STUDENT IN AN ORGANIZED HEALTH CARE EDUCATION/TRAINING PROGRAM

## 2024-03-19 PROCEDURE — 80053 COMPREHEN METABOLIC PANEL: CPT

## 2024-03-19 RX ORDER — FUROSEMIDE 10 MG/ML
40 INJECTION INTRAMUSCULAR; INTRAVENOUS ONCE
Status: COMPLETED | OUTPATIENT
Start: 2024-03-19 | End: 2024-03-19

## 2024-03-19 RX ORDER — SODIUM CHLORIDE 0.9 % (FLUSH) 0.9 %
10 SYRINGE (ML) INJECTION AS NEEDED
Status: DISCONTINUED | OUTPATIENT
Start: 2024-03-19 | End: 2024-03-19 | Stop reason: HOSPADM

## 2024-03-19 RX ORDER — MAGNESIUM SULFATE HEPTAHYDRATE 40 MG/ML
2 INJECTION, SOLUTION INTRAVENOUS ONCE
Status: COMPLETED | OUTPATIENT
Start: 2024-03-19 | End: 2024-03-19

## 2024-03-19 RX ORDER — TORSEMIDE 20 MG/1
40 TABLET ORAL DAILY
Qty: 14 TABLET | Refills: 0 | Status: SHIPPED | OUTPATIENT
Start: 2024-03-19 | End: 2024-03-26

## 2024-03-19 RX ADMIN — IOPAMIDOL 100 ML: 612 INJECTION, SOLUTION INTRAVENOUS at 14:48

## 2024-03-19 RX ADMIN — MAGNESIUM SULFATE HEPTAHYDRATE 2 G: 40 INJECTION, SOLUTION INTRAVENOUS at 16:03

## 2024-03-19 RX ADMIN — FUROSEMIDE 40 MG: 10 INJECTION, SOLUTION INTRAMUSCULAR; INTRAVENOUS at 16:01

## 2024-03-19 NOTE — ED PROVIDER NOTES
Subjective:  History of Present Illness:    Patient is a sick 8-year-old male with history of CHF, not on home Lasix, hypertension, hyperlipidemia, JUAN who presents today with shortness of breath.  Reports acute onset of dyspnea today.  He followed up with his primary care doctor told to present to our emergency department for evaluation.  No new pedal edema.  Patient does report that he fell 2 weeks ago and has extensive bruising to his left upper leg.  However, has no history of blood clot.  Denies any fevers.  No nausea vomiting or diarrhea.  Denies abdominal pain.      Nurses Notes reviewed and agree, including vitals, allergies, social history and prior medical history.     REVIEW OF SYSTEMS: All systems reviewed and not pertinent unless noted.  Review of Systems   Constitutional:  Positive for activity change and fatigue. Negative for appetite change, chills and fever.   HENT:  Negative for congestion, sinus pressure, sneezing and trouble swallowing.    Eyes:  Negative for discharge and itching.   Respiratory:  Positive for cough and shortness of breath.    Cardiovascular:  Positive for chest pain. Negative for palpitations.   Gastrointestinal:  Negative for abdominal distention, abdominal pain, diarrhea, nausea and vomiting.   Endocrine: Negative for cold intolerance and heat intolerance.   Genitourinary:  Negative for decreased urine volume, dysuria and urgency.   Musculoskeletal:  Negative for gait problem, neck pain and neck stiffness.   Skin:  Negative for color change and rash.   Allergic/Immunologic: Negative for immunocompromised state.   Neurological:  Negative for facial asymmetry and headaches.   Hematological:  Negative for adenopathy.   Psychiatric/Behavioral:  Negative for self-injury and suicidal ideas.        Past Medical History:   Diagnosis Date    Basal cell carcinoma     CHF (congestive heart failure)     Colon polyp 2012    Disease of thyroid gland     DJD (degenerative joint disease),  "ankle and foot, left     Elevated cholesterol     Elevated LFTs     Gout     Heart murmur     Hyperlipidemia     Hypertension     JUAN (obstructive sleep apnea)     Sleep apnea     REPORTS HE TURNED CPAP IN, WAS UNABLE TO WEAR IT    Snores        Allergies:    Patient has no known allergies.      Past Surgical History:   Procedure Laterality Date    COLONOSCOPY      COLONOSCOPY N/A 2017    Procedure: COLONOSCOPY WITH HOT BIOPSY POLYPECTOMIES;  Surgeon: Rohith Underwood MD;  Location: [facility] ENDOSCOPY;  Service:     COLONOSCOPY      COLONOSCOPY N/A 2023    Procedure: COLONOSCOPY FOR SCREENING WITH BIOPSY;  Surgeon: Anne Mccrary MD;  Location: [facility] ENDOSCOPY;  Service: Gastroenterology;  Laterality: N/A;    FOOT SURGERY Left          Social History     Socioeconomic History    Marital status:    Tobacco Use    Smoking status: Former     Current packs/day: 0.00     Average packs/day: 3.0 packs/day for 30.0 years (90.0 ttl pk-yrs)     Types: Cigarettes     Start date:      Quit date:      Years since quittin.2    Smokeless tobacco: Never   Vaping Use    Vaping status: Never Used   Substance and Sexual Activity    Alcohol use: Not Currently     Alcohol/week: 6.0 standard drinks of alcohol     Types: 6 Cans of beer per week     Comment: 6-7 beers per day- no ETOH in the last three weeks per report 23    Drug use: No    Sexual activity: Defer         Family History   Problem Relation Age of Onset    Liver disease Mother     Alcohol abuse Mother     Cirrhosis Mother     Colon cancer Paternal Uncle        Objective  Physical Exam:  /66   Pulse 92   Temp 97.2 °F (36.2 °C)   Resp 19   Ht 182.9 cm (72\")   Wt 129 kg (285 lb)   SpO2 95%   BMI 38.65 kg/m²      Physical Exam  Constitutional:       General: He is not in acute distress.     Appearance: Normal appearance. He is obese. He is not ill-appearing or toxic-appearing.   HENT:      Head: Normocephalic and " atraumatic.      Nose: Nose normal. No congestion or rhinorrhea.      Mouth/Throat:      Mouth: Mucous membranes are moist.      Pharynx: Oropharynx is clear.   Eyes:      Extraocular Movements: Extraocular movements intact.      Conjunctiva/sclera: Conjunctivae normal.      Pupils: Pupils are equal, round, and reactive to light.   Cardiovascular:      Rate and Rhythm: Normal rate and regular rhythm.      Pulses: Normal pulses.   Pulmonary:      Effort: Pulmonary effort is normal. Tachypnea present. No accessory muscle usage or respiratory distress.      Breath sounds: Normal breath sounds. No wheezing or rales.   Abdominal:      General: Abdomen is flat. Bowel sounds are normal. There is no distension.      Palpations: Abdomen is soft.      Tenderness: There is no abdominal tenderness.   Musculoskeletal:         General: No swelling or tenderness. Normal range of motion.      Cervical back: Normal range of motion and neck supple. No rigidity or tenderness.   Skin:     General: Skin is warm and dry.      Capillary Refill: Capillary refill takes less than 2 seconds.   Neurological:      General: No focal deficit present.      Mental Status: He is alert and oriented to person, place, and time. Mental status is at baseline.      Cranial Nerves: No cranial nerve deficit.      Sensory: No sensory deficit.      Motor: No weakness.   Psychiatric:         Mood and Affect: Mood normal.         Behavior: Behavior normal.         Thought Content: Thought content normal.         Judgment: Judgment normal.         Procedures    ED Course:    ED Course as of 03/19/24 1609   Tue Mar 19, 2024   1253 EKG interpreted by me, sinus tachycardia with no concerning ST changes noted, rate of 109 [JE]      ED Course User Index  [JE] Chris Umana MD       Lab Results (last 24 hours)       Procedure Component Value Units Date/Time    COVID-19 and FLU A/B PCR, 1 HR TAT - Swab, Nasopharynx [[id number]]  (Normal) Collected: 03/19/24 1255     Specimen: Swab from Nasopharynx Updated: 03/19/24 1325     COVID19 Not Detected     Influenza A PCR Not Detected     Influenza B PCR Not Detected    Narrative:      Fact sheet for providers: https://www.fda.gov/media/541294/download    Fact sheet for patients: https://www.fda.gov/media/718395/download    Test performed by PCR.    CBC & Differential [[id number]]  (Abnormal) Collected: 03/19/24 1308    Specimen: Blood Updated: 03/19/24 1319    Narrative:      The following orders were created for panel order CBC & Differential.  Procedure                               Abnormality         Status                     ---------                               -----------         ------                     CBC Auto Differential[[id number]]        Abnormal            Final result                 Please view results for these tests on the individual orders.    Comprehensive Metabolic Panel [[id number]]  (Abnormal) Collected: 03/19/24 1308    Specimen: Blood Updated: 03/19/24 1346     Glucose 102 mg/dL      BUN 19 mg/dL      Creatinine 0.82 mg/dL      Sodium 137 mmol/L      Potassium 3.9 mmol/L      Chloride 100 mmol/L      CO2 21.9 mmol/L      Calcium 8.8 mg/dL      Total Protein 6.9 g/dL      Albumin 3.8 g/dL      ALT (SGPT) 103 U/L      AST (SGOT) 93 U/L      Alkaline Phosphatase 95 U/L      Total Bilirubin 0.6 mg/dL      Globulin 3.1 gm/dL      A/G Ratio 1.2 g/dL      BUN/Creatinine Ratio 23.2     Anion Gap 15.1 mmol/L      eGFR 96.3 mL/min/1.73     Narrative:      GFR Normal >60  Chronic Kidney Disease <60  Kidney Failure <15      BNP [[id number]]  (Abnormal) Collected: 03/19/24 1308    Specimen: Blood Updated: 03/19/24 1348     proBNP 2,888.0 pg/mL     Narrative:      This assay is used as an aid in the diagnosis of individuals suspected of having heart failure. It can be used as an aid in the diagnosis of acute decompensated heart failure (ADHF) in patients presenting with signs and symptoms of ADHF to the emergency  department (ED). In addition, NT-proBNP of <300 pg/mL indicates ADHF is not likely.    Age Range Result Interpretation  NT-proBNP Concentration (pg/mL:      <50             Positive            >450                   Gray                 300-450                    Negative             <300    50-75           Positive            >900                  Gray                300-900                  Negative            <300      >75             Positive            >1800                  Gray                300-1800                  Negative            <300    CBC Auto Differential [[id number]]  (Abnormal) Collected: 03/19/24 1308    Specimen: Blood Updated: 03/19/24 1319     WBC 7.82 10*3/mm3      RBC 4.22 10*6/mm3      Hemoglobin 12.9 g/dL      Hematocrit 36.8 %      MCV 87.2 fL      MCH 30.6 pg      MCHC 35.1 g/dL      RDW 13.8 %      RDW-SD 43.4 fl      MPV 9.7 fL      Platelets 375 10*3/mm3      Neutrophil % 73.3 %      Lymphocyte % 18.0 %      Monocyte % 6.6 %      Eosinophil % 1.2 %      Basophil % 0.4 %      Immature Grans % 0.5 %      Neutrophils, Absolute 5.73 10*3/mm3      Lymphocytes, Absolute 1.41 10*3/mm3      Monocytes, Absolute 0.52 10*3/mm3      Eosinophils, Absolute 0.09 10*3/mm3      Basophils, Absolute 0.03 10*3/mm3      Immature Grans, Absolute 0.04 10*3/mm3      nRBC 0.0 /100 WBC     High Sensitivity Troponin T [[id number]]  (Abnormal) Collected: 03/19/24 1308    Specimen: Blood Updated: 03/19/24 1344     HS Troponin T 50 ng/L     Narrative:      High Sensitive Troponin T Reference Range:  <14.0 ng/L- Negative Female for AMI  <22.0 ng/L- Negative Male for AMI  >=14 - Abnormal Female indicating possible myocardial injury.  >=22 - Abnormal Male indicating possible myocardial injury.   Clinicians would have to utilize clinical acumen, EKG, Troponin, and serial changes to determine if it is an Acute Myocardial Infarction or myocardial injury due to an underlying chronic condition.         C-reactive Protein  "[[id number]]  (Abnormal) Collected: 03/19/24 1308    Specimen: Blood Updated: 03/19/24 1346     C-Reactive Protein 3.82 mg/dL     Procalcitonin [[id number]]  (Normal) Collected: 03/19/24 1308    Specimen: Blood Updated: 03/19/24 1353     Procalcitonin 0.14 ng/mL     Narrative:      As a Marker for Sepsis (Non-Neonates):    1. <0.5 ng/mL represents a low risk of severe sepsis and/or septic shock.  2. >2 ng/mL represents a high risk of severe sepsis and/or septic shock.    As a Marker for Lower Respiratory Tract Infections that require antibiotic therapy:    PCT on Admission    Antibiotic Therapy       6-12 Hrs later    >0.5                Strongly Recommended  >0.25 - <0.5        Recommended   0.1 - 0.25          Discouraged              Remeasure/reassess PCT  <0.1                Strongly Discouraged     Remeasure/reassess PCT    As 28 day mortality risk marker: \"Change in Procalcitonin Result\" (>80% or <=80%) if Day 0 (or Day 1) and Day 4 values are available. Refer to http://[email or web address]-[email or web address]    Change in PCT <=80%  A decrease of PCT levels below or equal to 80% defines a positive change in PCT test result representing a higher risk for 28-day all-cause mortality of patients diagnosed with severe sepsis for septic shock.    Change in PCT >80%  A decrease of PCT levels of more than 80% defines a negative change in PCT result representing a lower risk for 28-day all-cause mortality of patients diagnosed with severe sepsis or septic shock.       Magnesium [[id number]]  (Abnormal) Collected: 03/19/24 1308    Specimen: Blood Updated: 03/19/24 1346     Magnesium 1.5 mg/dL     D-dimer, Quantitative [[id number]]  (Abnormal) Collected: 03/19/24 1308    Specimen: Blood Updated: 03/19/24 1341     D-Dimer, Quantitative 1.07 MCGFEU/mL     Narrative:      According to the assay 's published package insert, a normal (<0.50 MCGFEU/mL) D-dimer result in conjunction with a non-high clinical probability " "assessment, excludes deep vein thrombosis (DVT) and pulmonary embolism (PE) with high sensitivity.    D-dimer values increase with age and this can make VTE exclusion of an older population difficult. To address this, the [employer], based on best available evidence and recent guidelines, recommends that clinicians use age-adjusted D-dimer thresholds in patients greater than 50 years of age with: a) a low probability of PE who do not meet all Pulmonary Embolism Rule Out Criteria, or b) in those with intermediate probability of PE.   The formula for an age-adjusted D-dimer cut-off is \"age/100\".  For example, a 60 year old patient would have an age-adjusted cut-off of 0.60 MCGFEU/mL and an 80 year old 0.80 MCGFEU/mL.    High Sensitivity Troponin T 2Hr [[id number]]  (Abnormal) Collected: 03/19/24 1524    Specimen: Blood Updated: 03/19/24 1553     HS Troponin T 40 ng/L      Troponin T Delta -10 ng/L     Narrative:      High Sensitive Troponin T Reference Range:  <14.0 ng/L- Negative Female for AMI  <22.0 ng/L- Negative Male for AMI  >=14 - Abnormal Female indicating possible myocardial injury.  >=22 - Abnormal Male indicating possible myocardial injury.   Clinicians would have to utilize clinical acumen, EKG, Troponin, and serial changes to determine if it is an Acute Myocardial Infarction or myocardial injury due to an underlying chronic condition.                  US Venous Doppler Lower Extremity Left (duplex)    Result Date: 3/19/2024  PROCEDURE: US VENOUS DOPPLER LOWER EXTREMITY LEFT (DUPLEX)-  HISTORY: pain swelling + d dimer eval DVT; I50.33-Acute on chronic diastolic (congestive) heart failure  Multiple transverse and longitudinal scans were performed of the femoropopliteal deep venous system, with augmentation and compression maneuvers.  FINDINGS: Normal phasic flow was noted in the visualized deep venous system. No intraluminal increased echogenicity is noted to suggest thrombus. There is " normal compression and augmentation of the venous structures. No abnormal venous collaterals are seen.      Impression: No evidence of deep venous thrombosis of the left lower extremity.   This report was signed and finalized on 3/19/2024 3:44 PM by Ovidio Robbins MD.      CT Angiogram Chest Pulmonary Embolism    Result Date: 3/19/2024  PROCEDURE: CT ANGIOGRAM CHEST PULMONARY EMBOLISM-  HISTORY: Pulmonary embolism (PE) suspected, positive D-dimer  TECHNIQUE: Thin section axial CT with IV contrast supplemented with 3D reconstructed MIP images.  COMPARISON: 7/18/2022  FINDINGS:  Pulmonary vessels enhance in a normal fashion without evidence of embolic disease. Thoracic aorta is normal in caliber without evidence of aneurysm or dissection.  No acute lung disease is present.  No pleural or pericardial effusion is seen. No adenopathy or mass lesion is present.      Impression: 1. No evidence of pulmonary embolism.    This study was performed with techniques to keep radiation doses as low as reasonably achievable (ALARA). Individualized dose reduction techniques using automated exposure control or adjustment of vA and/or kV according to the patient size were employed.  This report was signed and finalized on 3/19/2024 3:20 PM by Ovidio Robbins MD.      XR Chest 1 View    Result Date: 3/19/2024  PROCEDURE: XR CHEST 1 VW-  HISTORY: [facility] Triage Protocol  COMPARISON: 7/18/2022  FINDINGS:  Portable view of the chest demonstrates the lungs to be grossly clear. There is no evidence of effusion, pneumothorax or other significant pleural disease. The mediastinum is unremarkable.  The heart size is normal.      Impression: Unremarkable portable chest.    This report was signed and finalized on 3/19/2024 1:13 PM by Ovidio Robbins MD.          MDM     Amount and/or Complexity of Data Reviewed  Decide to obtain previous medical records or to obtain history from someone other than the patient: yes        Initial impression of presenting  illness: Shortness of breath, chest pain    DDX: includes but is not limited to: PE, viral URI, CHF exacerbation, bacterial pneumonia, viral URI    Patient arrives stable with vitals interpreted by myself.     Pertinent features from physical exam: Cardiac, tachypneic with regular rhythm, no murmur, clear to auscultation.    Initial diagnostic plan: CBC, CMP, troponin, BNP, D-dimer, EKG, chest x-ray, CRP, Pro-Олег    Results from initial plan were reviewed and interpreted by me revealing D-dimer was positive, given this CT PE obtained showing no concerns for DVT or PE, lower extremity ultrasound also obtained showing no DVT.  Elevated BNP concerning for heart failure    Diagnostic information from other sources: Reviewed past medical records    Interventions / Re-evaluation: Given my concern for heart failure exacerbation is etiology of patient's shortness of breath given IV Lasix and replete patient's magnesium patient remained stable during ER course    Results/clinical rationale were discussed with patient at bedside    Consultations/Discussion of results with other physicians: Discussed negative workup for pneumonia, PE with patient, given elevated BNP suspect heart failure.  Have increased patient's dose of torsemide as an outpatient over the next week and given IV Lasix in emergency department.  Encourage patient to follow-up with primary care doctor to discuss this treatment regimen change and reassess.  Also given strict return precaution for increased work of breathing and shortness of breath in spite of this therapy.    Disposition plan: Discharge  -----        Final diagnoses:   Acute on chronic diastolic congestive heart failure          Chris Umana MD  03/19/24 0735

## 2024-03-19 NOTE — Clinical Note
[facility] EMERGENCY DEPARTMENT  [address] [address]  Phone: [phone]    Damion Todd was seen and treated in our emergency department on 3/19/2024.  He may return to work on 03/22/2024.         Thank you for choosing [facility].    Chris Umana MD

## 2024-03-19 NOTE — DISCHARGE INSTRUCTIONS
You were evaluated due to concerns for shortness of breath.  We got labs and a chest x-ray that showed no concern for pneumonia.  We got a CT scan of your chest that showed no concern for blood clot.  We also got a DVT scan of your leg.  You are now stable for discharge.  As we discussed, we believe that your shortness of breath may be related to fluid retention.  We have given you IV dose of diuretic in the emergency department and have increased your dose of diuretic as an outpatient to 40 mg over the next week.  We recommend following up with your doctor to ensure that you have improved appropriately.  If you have increase in shortness of breath in spite of the above therapy please come back to emergency department for further evaluation.  You are now stable for discharge.

## 2024-04-04 ENCOUNTER — TRANSCRIBE ORDERS (OUTPATIENT)
Dept: ADMINISTRATIVE | Facility: HOSPITAL | Age: 69
End: 2024-04-04
Payer: MEDICARE

## 2024-04-04 DIAGNOSIS — Z13.6 SCREENING FOR AAA (AORTIC ABDOMINAL ANEURYSM): Primary | ICD-10-CM

## 2024-05-17 ENCOUNTER — HOSPITAL ENCOUNTER (OUTPATIENT)
Dept: ULTRASOUND IMAGING | Facility: HOSPITAL | Age: 69
Discharge: HOME | End: 2024-05-17
Payer: MEDICARE

## 2024-05-17 DIAGNOSIS — Z13.6 SCREENING FOR AAA (AORTIC ABDOMINAL ANEURYSM): ICD-10-CM

## 2024-05-17 PROCEDURE — 76706 US ABDL AORTA SCREEN AAA: CPT

## 2025-07-15 ENCOUNTER — APPOINTMENT (OUTPATIENT)
Dept: GENERAL RADIOLOGY | Facility: HOSPITAL | Age: 70
End: 2025-07-15
Payer: MEDICARE

## 2025-07-15 ENCOUNTER — HOSPITAL ENCOUNTER (EMERGENCY)
Facility: HOSPITAL | Age: 70
Discharge: HOME | End: 2025-07-16
Attending: STUDENT IN AN ORGANIZED HEALTH CARE EDUCATION/TRAINING PROGRAM | Admitting: STUDENT IN AN ORGANIZED HEALTH CARE EDUCATION/TRAINING PROGRAM
Payer: MEDICARE

## 2025-07-15 DIAGNOSIS — R07.9 CHEST PAIN, UNSPECIFIED TYPE: Primary | ICD-10-CM

## 2025-07-15 LAB
ALBUMIN SERPL-MCNC: 3.6 G/DL (ref 3.5–5.2)
ALBUMIN/GLOB SERPL: 1.3 G/DL
ALP SERPL-CCNC: 99 U/L (ref 39–117)
ALT SERPL W P-5'-P-CCNC: 80 U/L (ref 1–41)
ANION GAP SERPL CALCULATED.3IONS-SCNC: 14.8 MMOL/L (ref 5–15)
AST SERPL-CCNC: 100 U/L (ref 1–40)
BASOPHILS # BLD AUTO: 0.05 10*3/MM3 (ref 0–0.2)
BASOPHILS NFR BLD AUTO: 0.5 % (ref 0–1.5)
BILIRUB SERPL-MCNC: 0.5 MG/DL (ref 0–1.2)
BUN SERPL-MCNC: 21 MG/DL (ref 8–23)
BUN/CREAT SERPL: 19.4 (ref 7–25)
CALCIUM SPEC-SCNC: 8.9 MG/DL (ref 8.6–10.5)
CHLORIDE SERPL-SCNC: 95 MMOL/L (ref 98–107)
CO2 SERPL-SCNC: 21.2 MMOL/L (ref 22–29)
CREAT SERPL-MCNC: 1.08 MG/DL (ref 0.76–1.27)
DEPRECATED RDW RBC AUTO: 46.7 FL (ref 37–54)
EGFRCR SERPLBLD CKD-EPI 2021: 74.7 ML/MIN/1.73
EOSINOPHIL # BLD AUTO: 0.08 10*3/MM3 (ref 0–0.4)
EOSINOPHIL NFR BLD AUTO: 0.8 % (ref 0.3–6.2)
ERYTHROCYTE [DISTWIDTH] IN BLOOD BY AUTOMATED COUNT: 14.5 % (ref 12.3–15.4)
GLOBULIN UR ELPH-MCNC: 2.7 GM/DL
GLUCOSE SERPL-MCNC: 99 MG/DL (ref 65–99)
HCT VFR BLD AUTO: 38.1 % (ref 37.5–51)
HGB BLD-MCNC: 13.4 G/DL (ref 13–17.7)
HOLD SPECIMEN: NORMAL
HOLD SPECIMEN: NORMAL
IMM GRANULOCYTES # BLD AUTO: 0.04 10*3/MM3 (ref 0–0.05)
IMM GRANULOCYTES NFR BLD AUTO: 0.4 % (ref 0–0.5)
LYMPHOCYTES # BLD AUTO: 3.83 10*3/MM3 (ref 0.7–3.1)
LYMPHOCYTES NFR BLD AUTO: 36.8 % (ref 19.6–45.3)
MCH RBC QN AUTO: 31.4 PG (ref 26.6–33)
MCHC RBC AUTO-ENTMCNC: 35.2 G/DL (ref 31.5–35.7)
MCV RBC AUTO: 89.2 FL (ref 79–97)
MONOCYTES # BLD AUTO: 0.93 10*3/MM3 (ref 0.1–0.9)
MONOCYTES NFR BLD AUTO: 8.9 % (ref 5–12)
NEUTROPHILS NFR BLD AUTO: 5.48 10*3/MM3 (ref 1.7–7)
NEUTROPHILS NFR BLD AUTO: 52.6 % (ref 42.7–76)
NRBC BLD AUTO-RTO: 0 /100 WBC (ref 0–0.2)
PLATELET # BLD AUTO: 217 10*3/MM3 (ref 140–450)
PMV BLD AUTO: 9.5 FL (ref 6–12)
POTASSIUM SERPL-SCNC: 4.2 MMOL/L (ref 3.5–5.2)
PROT SERPL-MCNC: 6.3 G/DL (ref 6–8.5)
RBC # BLD AUTO: 4.27 10*6/MM3 (ref 4.14–5.8)
SODIUM SERPL-SCNC: 131 MMOL/L (ref 136–145)
TROPONIN T SERPL HS-MCNC: 16 NG/L
WBC NRBC COR # BLD AUTO: 10.41 10*3/MM3 (ref 3.4–10.8)
WHOLE BLOOD HOLD COAG: NORMAL
WHOLE BLOOD HOLD SPECIMEN: NORMAL

## 2025-07-15 PROCEDURE — 84484 ASSAY OF TROPONIN QUANT: CPT | Performed by: STUDENT IN AN ORGANIZED HEALTH CARE EDUCATION/TRAINING PROGRAM

## 2025-07-15 PROCEDURE — 85025 COMPLETE CBC W/AUTO DIFF WBC: CPT | Performed by: STUDENT IN AN ORGANIZED HEALTH CARE EDUCATION/TRAINING PROGRAM

## 2025-07-15 PROCEDURE — 93005 ELECTROCARDIOGRAM TRACING: CPT | Performed by: STUDENT IN AN ORGANIZED HEALTH CARE EDUCATION/TRAINING PROGRAM

## 2025-07-15 PROCEDURE — 36415 COLL VENOUS BLD VENIPUNCTURE: CPT

## 2025-07-15 PROCEDURE — 80053 COMPREHEN METABOLIC PANEL: CPT | Performed by: STUDENT IN AN ORGANIZED HEALTH CARE EDUCATION/TRAINING PROGRAM

## 2025-07-15 PROCEDURE — 99284 EMERGENCY DEPT VISIT MOD MDM: CPT | Performed by: STUDENT IN AN ORGANIZED HEALTH CARE EDUCATION/TRAINING PROGRAM

## 2025-07-15 PROCEDURE — 71045 X-RAY EXAM CHEST 1 VIEW: CPT

## 2025-07-15 RX ORDER — SODIUM CHLORIDE 0.9 % (FLUSH) 0.9 %
10 SYRINGE (ML) INJECTION AS NEEDED
Status: DISCONTINUED | OUTPATIENT
Start: 2025-07-15 | End: 2025-07-16 | Stop reason: HOSPADM

## 2025-07-16 VITALS
HEIGHT: 72 IN | TEMPERATURE: 98 F | BODY MASS INDEX: 39.96 KG/M2 | HEART RATE: 79 BPM | OXYGEN SATURATION: 95 % | WEIGHT: 295 LBS | RESPIRATION RATE: 20 BRPM | SYSTOLIC BLOOD PRESSURE: 131 MMHG | DIASTOLIC BLOOD PRESSURE: 58 MMHG

## 2025-07-16 LAB
GEN 5 1HR TROPONIN T REFLEX: 16 NG/L
TROPONIN T NUMERIC DELTA: 0 NG/L

## 2025-07-16 PROCEDURE — 36415 COLL VENOUS BLD VENIPUNCTURE: CPT

## 2025-07-16 PROCEDURE — 84484 ASSAY OF TROPONIN QUANT: CPT | Performed by: STUDENT IN AN ORGANIZED HEALTH CARE EDUCATION/TRAINING PROGRAM

## 2025-07-16 NOTE — ED PROVIDER NOTES
Subjective  History of Present Illness:    Chief Complaint: Left chest pain  History of Present Illness: 69-year-old male presents today with left chest pain for a few days.  The pain randomly started and he describes it as sharp 8 out of 10 pain localized to his left chest.  He states the pain is worsened with coughing and twisting.  Onset: Sudden  Duration: Few days  Exacerbating / Alleviating factors: Pain worsened with coughing and moving  Associated symptoms: None      Nurses Notes reviewed and agree, including vitals, allergies, social history and prior medical history.     REVIEW OF SYSTEMS: All systems reviewed and not pertinent unless noted.    Review of Systems   Cardiovascular:  Positive for chest pain.   All other systems reviewed and are negative.      Past Medical History:   Diagnosis Date    Basal cell carcinoma     CHF (congestive heart failure)     Colon polyp 2012    Disease of thyroid gland     DJD (degenerative joint disease), ankle and foot, left     Elevated cholesterol     Elevated LFTs     Gout     Heart murmur     Hyperlipidemia     Hypertension     JUAN (obstructive sleep apnea)     Sleep apnea     REPORTS HE TURNED CPAP IN, WAS UNABLE TO WEAR IT    Snores        Allergies:    Patient has no known allergies.      Past Surgical History:   Procedure Laterality Date    COLONOSCOPY  2012    COLONOSCOPY N/A 06/20/2017    Procedure: COLONOSCOPY WITH HOT BIOPSY POLYPECTOMIES;  Surgeon: Rohith Underwood MD;  Location: [facility] ENDOSCOPY;  Service:     COLONOSCOPY      COLONOSCOPY N/A 6/21/2023    Procedure: COLONOSCOPY FOR SCREENING WITH BIOPSY;  Surgeon: Anne Mccrary MD;  Location: [facility] ENDOSCOPY;  Service: Gastroenterology;  Laterality: N/A;    FOOT SURGERY Left          Social History     Socioeconomic History    Marital status:    Tobacco Use    Smoking status: Former     Current packs/day: 0.00     Average packs/day: 3.0 packs/day for 30.0 years (90.0 ttl pk-yrs)     Types:  "Cigarettes     Start date:      Quit date:      Years since quittin.5    Smokeless tobacco: Never   Vaping Use    Vaping status: Never Used   Substance and Sexual Activity    Alcohol use: Not Currently     Alcohol/week: 6.0 standard drinks of alcohol     Types: 6 Cans of beer per week     Comment: 6-7 beers per day- no ETOH in the last three weeks per report 23    Drug use: No    Sexual activity: Defer         Family History   Problem Relation Age of Onset    Liver disease Mother     Alcohol abuse Mother     Cirrhosis Mother     Colon cancer Paternal Uncle        Objective  Physical Exam:  /58   Pulse 79   Temp 98 °F (36.7 °C) (Oral)   Resp 20   Ht 182.9 cm (72\")   Wt 134 kg (295 lb)   SpO2 95%   BMI 40.01 kg/m²      Physical Exam  Vitals and nursing note reviewed.   Constitutional:       Appearance: He is well-developed.   HENT:      Head: Normocephalic and atraumatic.      Mouth/Throat:      Mouth: Mucous membranes are moist.   Cardiovascular:      Rate and Rhythm: Normal rate and regular rhythm.   Pulmonary:      Effort: Pulmonary effort is normal.      Breath sounds: Normal breath sounds.   Chest:      Chest wall: Tenderness present.   Neurological:      Mental Status: He is alert and oriented to person, place, and time.   Psychiatric:         Behavior: Behavior normal.         Thought Content: Thought content normal.         Judgment: Judgment normal.           Procedures    ED Course:    ED Course as of 25 011   0010 Review of previous  non ED visits, prior labs, prior imaging, available notes from prior evaluations or visits with specialists, medication list, allergies, past medical history, past surgical history     [CS]   0056 I Personally reviewed all laboratory studies performed in the emergency department  [CS]      ED Course User Index  [CS] Pepe Rosas Jr., MAN       Lab Results (last 24 hours)       Procedure Component Value Units " Date/Time    CBC & Differential [[id number]]  (Abnormal) Collected: 07/15/25 2305    Specimen: Blood Updated: 07/15/25 2311    Narrative:      The following orders were created for panel order CBC & Differential.  Procedure                               Abnormality         Status                     ---------                               -----------         ------                     CBC Auto Differential[[id number]]        Abnormal            Final result                 Please view results for these tests on the individual orders.    Comprehensive Metabolic Panel [[id number]]  (Abnormal) Collected: 07/15/25 2305    Specimen: Blood Updated: 07/15/25 2329     Glucose 99 mg/dL      BUN 21.0 mg/dL      Creatinine 1.08 mg/dL      Sodium 131 mmol/L      Potassium 4.2 mmol/L      Comment: Specimen hemolyzed.  Result may be falsely elevated.        Chloride 95 mmol/L      CO2 21.2 mmol/L      Calcium 8.9 mg/dL      Total Protein 6.3 g/dL      Albumin 3.6 g/dL      ALT (SGPT) 80 U/L      Comment: Specimen hemolyzed.  Result may  be falsely elevated.        AST (SGOT) 100 U/L      Comment: Specimen hemolyzed.  Result may be falsely elevated.        Alkaline Phosphatase 99 U/L      Total Bilirubin 0.5 mg/dL      Globulin 2.7 gm/dL      A/G Ratio 1.3 g/dL      BUN/Creatinine Ratio 19.4     Anion Gap 14.8 mmol/L      eGFR 74.7 mL/min/1.73     Narrative:      GFR Categories in Chronic Kidney Disease (CKD)              GFR Category          GFR (mL/min/1.73)    Interpretation  G1                    90 or greater        Normal or high (1)  G2                    60-89                Mild decrease (1)  G3a                   45-59                Mild to moderate decrease  G3b                   30-44                Moderate to severe decrease  G4                    15-29                Severe decrease  G5                    14 or less           Kidney failure    (1)In the absence of evidence of kidney disease, neither GFR category G1  or G2 fulfill the criteria for CKD.    eGFR calculation 2021 CKD-EPI creatinine equation, which does not include race as a factor    High Sensitivity Troponin T [[id number]]  (Normal) Collected: 07/15/25 2305    Specimen: Blood Updated: 07/15/25 2334     HS Troponin T 16 ng/L      Comment: Specimen hemolyzed.  Results may be falsely decreased.       Narrative:      High Sensitive Troponin T Reference Range:  <14.0 ng/L- Negative Female for AMI  <22.0 ng/L- Negative Male for AMI  >=14 - Abnormal Female indicating possible myocardial injury.  >=22 - Abnormal Male indicating possible myocardial injury.   Clinicians would have to utilize clinical acumen, EKG, Troponin, and serial changes to determine if it is an Acute Myocardial Infarction or myocardial injury due to an underlying chronic condition.         CBC Auto Differential [[id number]]  (Abnormal) Collected: 07/15/25 2305    Specimen: Blood Updated: 07/15/25 2311     WBC 10.41 10*3/mm3      RBC 4.27 10*6/mm3      Hemoglobin 13.4 g/dL      Hematocrit 38.1 %      MCV 89.2 fL      MCH 31.4 pg      MCHC 35.2 g/dL      RDW 14.5 %      RDW-SD 46.7 fl      MPV 9.5 fL      Platelets 217 10*3/mm3      Neutrophil % 52.6 %      Lymphocyte % 36.8 %      Monocyte % 8.9 %      Eosinophil % 0.8 %      Basophil % 0.5 %      Immature Grans % 0.4 %      Neutrophils, Absolute 5.48 10*3/mm3      Lymphocytes, Absolute 3.83 10*3/mm3      Monocytes, Absolute 0.93 10*3/mm3      Eosinophils, Absolute 0.08 10*3/mm3      Basophils, Absolute 0.05 10*3/mm3      Immature Grans, Absolute 0.04 10*3/mm3      nRBC 0.0 /100 WBC     High Sensitivity Troponin T 1Hr [[id number]]  (Normal) Collected: 07/16/25 0015    Specimen: Blood Updated: 07/16/25 0040     HS Troponin T 16 ng/L      Troponin T Numeric Delta 0 ng/L     Narrative:      High Sensitive Troponin T Reference Range:  <14.0 ng/L- Negative Female for AMI  <22.0 ng/L- Negative Male for AMI  >=14 - Abnormal Female indicating possible myocardial  injury.  >=22 - Abnormal Male indicating possible myocardial injury.   Clinicians would have to utilize clinical acumen, EKG, Troponin, and serial changes to determine if it is an Acute Myocardial Infarction or myocardial injury due to an underlying chronic condition.                  No radiology results from the last 24 hrs                    HEART Score: 4      Shared Decision Making  I discussed the findings with the patient/patient representative who is in agreement with the treatment plan and the final disposition.  Risks and benefits of discharge and/or observation/admission were discussed: Yes                                            Medical Decision Making  Patient Presentation chest pain    DDX. Differential would include acute MI, chest wall pain, reflux disease, panic disorder anxiety, pericarditis, pneumonia, heart failure, PE, acute dissection.       Data Review/ Non ED Records /Analysis/Ordering unique tests  Review of previous  non ED visits, prior labs, prior imaging, available notes from prior evaluations or visits with specialists, medication list, allergies, past medical history, past surgical history        Independent Review Studies  I Personally reviewed all laboratory studies performed in the emergency department     Intervention/Re-evaluation no acute intervention    Independent Clinician no consultation    Risk Stratification tools/clinical decision rules patient with left chest pain, was concerned about a cardiac source such as NSTEMI or STEMI, is appear less likely because he did not have ongoing pain however he did have significant risk therefore cardiac workup was initiated, his pain was intermittent, and reproducible with certain movements, deep breath or cough.  He had 2 negative cardiac enzymes, sure decision-making was discussed, based on his risk assessment.  Patient felt comfortable going home, and he was recommended that he follow-up with cardiology    Shared Decision Making  patient discussed this with the patient and he was agreed    Disposition patient stable for discharge    Problems Addressed:  Chest pain, unspecified type: complicated acute illness or injury    Amount and/or Complexity of Data Reviewed  External Data Reviewed: labs, radiology and notes.  Labs: ordered. Decision-making details documented in ED Course.  Radiology: ordered and independent interpretation performed.  ECG/medicine tests: ordered. Decision-making details documented in ED Course.    Risk  Prescription drug management.          Final diagnoses:   Chest pain, unspecified type           Disposition DISCHARGE    Patient discharged in stable condition.    Reviewed implications of results, diagnosis, meds, responsibility to follow up, warning signs and symptoms of possible worsening, potential complications and reasons to return to ER.    Patient/Family voiced understanding of above instructions.    Discussed plan for discharge, as there is no emergent indication for admission.  Pt/family is agreeable and understands need for follow up and possible repeat testing.  Pt/family is aware that discharge does not mean that nothing is wrong but that it indicates no emergency is currently present that requires admission and they must continue care with follow-up as given below or with a physician of their choice.     FOLLOW-UP  [facility] EMERGENCY DEPARTMENT  [address] [place] [id number] [phone]    If symptoms worsen         Medication List        Changed      * torsemide 20 MG tablet  Commonly known as: DEMADEX  Take 3 tablets by mouth Daily  What changed: how much to take     * torsemide 20 MG tablet  Commonly known as: DEMADEX  Take 2 tablets by mouth Daily for 7 days.  What changed: Another medication with the same name was changed. Make sure you understand how and when to take each.           * This list has 2 medication(s) that are the same as other medications prescribed  for you. Read the directions carefully, and ask your doctor or other care provider to review them with you.                      Pepe Rosas Jr., PAAdeelC  07/16/25 0111

## (undated) DEVICE — PAD GRND REM POLYHESIVE A/ DISP

## (undated) DEVICE — Device

## (undated) DEVICE — JELLY,LUBE,STERILE,FLIP TOP,TUBE,2-OZ: Brand: MEDLINE

## (undated) DEVICE — ENDOGATOR AUXILIARY WATER JET CONNECTOR: Brand: ENDOGATOR

## (undated) DEVICE — SYR PREFIL W/SALINE FLSH 10ML

## (undated) DEVICE — FRCP BIOP RADLJAW4 HOT 2.2X240 BX40